# Patient Record
Sex: FEMALE | URBAN - METROPOLITAN AREA
[De-identification: names, ages, dates, MRNs, and addresses within clinical notes are randomized per-mention and may not be internally consistent; named-entity substitution may affect disease eponyms.]

---

## 2022-06-04 ENCOUNTER — TELEPHONE ENCOUNTER (OUTPATIENT)
Dept: URBAN - METROPOLITAN AREA CLINIC 68 | Facility: CLINIC | Age: 62
End: 2022-06-04

## 2022-06-05 ENCOUNTER — TELEPHONE ENCOUNTER (OUTPATIENT)
Dept: URBAN - METROPOLITAN AREA CLINIC 68 | Facility: CLINIC | Age: 62
End: 2022-06-05

## 2022-06-25 ENCOUNTER — TELEPHONE ENCOUNTER (OUTPATIENT)
Age: 62
End: 2022-06-25

## 2022-06-26 ENCOUNTER — TELEPHONE ENCOUNTER (OUTPATIENT)
Age: 62
End: 2022-06-26

## 2024-08-07 ENCOUNTER — PATIENT OUTREACH (OUTPATIENT)
Dept: HEMATOLOGY/ONCOLOGY | Facility: CLINIC | Age: 64
End: 2024-08-07
Payer: COMMERCIAL

## 2024-08-07 ENCOUNTER — TELEPHONE (OUTPATIENT)
Dept: HEMATOLOGY/ONCOLOGY | Facility: CLINIC | Age: 64
End: 2024-08-07

## 2024-08-07 SDOH — ECONOMIC STABILITY: INCOME INSECURITY: IN THE LAST 12 MONTHS, WAS THERE A TIME WHEN YOU WERE NOT ABLE TO PAY THE MORTGAGE OR RENT ON TIME?: NO

## 2024-08-07 SDOH — ECONOMIC STABILITY: TRANSPORTATION INSECURITY
IN THE PAST 12 MONTHS, HAS THE LACK OF TRANSPORTATION KEPT YOU FROM MEDICAL APPOINTMENTS OR FROM GETTING MEDICATIONS?: NO

## 2024-08-07 SDOH — ECONOMIC STABILITY: TRANSPORTATION INSECURITY
IN THE PAST 12 MONTHS, HAS LACK OF TRANSPORTATION KEPT YOU FROM MEETINGS, WORK, OR FROM GETTING THINGS NEEDED FOR DAILY LIVING?: NO

## 2024-08-07 ASSESSMENT — SOCIAL DETERMINANTS OF HEALTH (SDOH)
WITHIN THE LAST YEAR, HAVE YOU BEEN KICKED, HIT, SLAPPED, OR OTHERWISE PHYSICALLY HURT BY YOUR PARTNER OR EX-PARTNER?: NO
WITHIN THE LAST YEAR, HAVE YOU BEEN HUMILIATED OR EMOTIONALLY ABUSED IN OTHER WAYS BY YOUR PARTNER OR EX-PARTNER?: NO
HOW HARD IS IT FOR YOU TO PAY FOR THE VERY BASICS LIKE FOOD, HOUSING, MEDICAL CARE, AND HEATING?: NOT HARD AT ALL
IN THE PAST 12 MONTHS, HAS THE ELECTRIC, GAS, OIL, OR WATER COMPANY THREATENED TO SHUT OFF SERVICE IN YOUR HOME?: NO
WITHIN THE LAST YEAR, HAVE TO BEEN RAPED OR FORCED TO HAVE ANY KIND OF SEXUAL ACTIVITY BY YOUR PARTNER OR EX-PARTNER?: NO
WITHIN THE LAST YEAR, HAVE YOU BEEN AFRAID OF YOUR PARTNER OR EX-PARTNER?: NO

## 2024-08-07 ASSESSMENT — LIFESTYLE VARIABLES
HOW OFTEN DO YOU HAVE SIX OR MORE DRINKS ON ONE OCCASION: NEVER
HOW MANY STANDARD DRINKS CONTAINING ALCOHOL DO YOU HAVE ON A TYPICAL DAY: PATIENT DOES NOT DRINK

## 2024-08-08 ENCOUNTER — OFFICE VISIT (OUTPATIENT)
Dept: HEMATOLOGY/ONCOLOGY | Facility: CLINIC | Age: 64
End: 2024-08-08
Payer: COMMERCIAL

## 2024-08-08 VITALS
SYSTOLIC BLOOD PRESSURE: 91 MMHG | WEIGHT: 132.61 LBS | BODY MASS INDEX: 20.1 KG/M2 | TEMPERATURE: 98.1 F | HEART RATE: 83 BPM | OXYGEN SATURATION: 97 % | DIASTOLIC BLOOD PRESSURE: 61 MMHG | RESPIRATION RATE: 16 BRPM | HEIGHT: 68 IN

## 2024-08-08 DIAGNOSIS — C18.9 MALIGNANT NEOPLASM OF COLON, UNSPECIFIED PART OF COLON (MULTI): Primary | ICD-10-CM

## 2024-08-08 DIAGNOSIS — C18.9 COLON CANCER (MULTI): ICD-10-CM

## 2024-08-08 PROCEDURE — G2211 COMPLEX E/M VISIT ADD ON: HCPCS | Performed by: STUDENT IN AN ORGANIZED HEALTH CARE EDUCATION/TRAINING PROGRAM

## 2024-08-08 PROCEDURE — 99215 OFFICE O/P EST HI 40 MIN: CPT | Performed by: STUDENT IN AN ORGANIZED HEALTH CARE EDUCATION/TRAINING PROGRAM

## 2024-08-08 PROCEDURE — 3008F BODY MASS INDEX DOCD: CPT | Performed by: STUDENT IN AN ORGANIZED HEALTH CARE EDUCATION/TRAINING PROGRAM

## 2024-08-08 RX ORDER — HEPARIN SODIUM,PORCINE/PF 10 UNIT/ML
50 SYRINGE (ML) INTRAVENOUS AS NEEDED
OUTPATIENT
Start: 2024-08-08

## 2024-08-08 RX ORDER — HEPARIN 100 UNIT/ML
500 SYRINGE INTRAVENOUS AS NEEDED
OUTPATIENT
Start: 2024-08-08

## 2024-08-08 RX ORDER — VIT C/E/ZN/COPPR/LUTEIN/ZEAXAN 250MG-90MG
25 CAPSULE ORAL DAILY
COMMUNITY

## 2024-08-08 RX ORDER — WARFARIN 7.5 MG/1
7.5 TABLET ORAL
COMMUNITY

## 2024-08-08 RX ORDER — PROCHLORPERAZINE MALEATE 10 MG
10 TABLET ORAL EVERY 6 HOURS PRN
Qty: 30 TABLET | Refills: 5 | Status: SHIPPED | OUTPATIENT
Start: 2024-08-08

## 2024-08-08 ASSESSMENT — COLUMBIA-SUICIDE SEVERITY RATING SCALE - C-SSRS
1. IN THE PAST MONTH, HAVE YOU WISHED YOU WERE DEAD OR WISHED YOU COULD GO TO SLEEP AND NOT WAKE UP?: NO
6. HAVE YOU EVER DONE ANYTHING, STARTED TO DO ANYTHING, OR PREPARED TO DO ANYTHING TO END YOUR LIFE?: NO
2. HAVE YOU ACTUALLY HAD ANY THOUGHTS OF KILLING YOURSELF?: NO

## 2024-08-08 ASSESSMENT — PATIENT HEALTH QUESTIONNAIRE - PHQ9
SUM OF ALL RESPONSES TO PHQ9 QUESTIONS 1 AND 2: 0
1. LITTLE INTEREST OR PLEASURE IN DOING THINGS: NOT AT ALL
2. FEELING DOWN, DEPRESSED OR HOPELESS: NOT AT ALL

## 2024-08-08 ASSESSMENT — ENCOUNTER SYMPTOMS
OCCASIONAL FEELINGS OF UNSTEADINESS: 0
LOSS OF SENSATION IN FEET: 0
DEPRESSION: 0

## 2024-08-08 ASSESSMENT — PAIN SCALES - GENERAL: PAINLEVEL: 0-NO PAIN

## 2024-08-08 NOTE — PROGRESS NOTES
Patient ID: Harmony Victor is a 64 y.o. female.  Visit Type: Initial Visit    Cancer History:  DIAGNOSIS: R-sided colon cancer     STAGING: IV    CURRENT SITES OF DISEASE:   Liver, ileocecal valve, ?head of pancreas    MOLECULAR/GENOMIC:  MMR-deficient (loss of MLH1 and PMS2  KRAS-WT  NRAS-WT  BRAF-WT    HER2 IHC equiv, FISH negative    TUMOR MARKER:   2/2024:  19, CA 19-9 492, CEA 6.2, AFP 2.1    CURRENT TREATMENT:   3/6/24 -- present: pembrolizumab    PRIOR TREATMENT:   2/19/24: FOLFOX x1 cycle, d/c after MMR IHC showed deficiency    HISTORY:   January 2024: Swelling and pain in left upper extremity   1/28/2024: US arm duplex left venous showed acute DVT in the mid to distal brachial vein, and superficial venous thrombosis mid distal basilic vein. US LEs showed b/l occlusive thrombus in L proximal femoral vein through the visible left upper calf veins. CT PE  mild burden of bilateral pulmonary emboli beginning at the segmental level and extending peripherally. No CT evidence of right heart strain.  CT venogram abdomen: DVT LLE extending into superficial femoral vein. Partially visualized pulmonary emboli. Irregular bladder wall thickening. Enlarged intra-abdominal lymph nodes. Findings concerning for potential underlying malignant process.    1/29/2024: CT A/P: Multiple hypodense lesions throughout both lobes of the liver most consistent with hepatic metastatic disease. Inflammation within the adipose tissue the inferior pelvis surrounding the uterus and rectum extending to the presacral space. This is of uncertain etiology. No focal mass identified. Very small bilateral pleural effusions.    1/30/2024: R liver lesion biopsy. Path showed metastatic adenocarcinoma consistent with colorectal primary. Tumor cells positive for CK20, CDX2, SATB2. Negative for CK7, TTF 1, Napsin a, [place] 8, lara 3 and ER. Morphologic as well as immunohistochemical features consistent with metastatic colorectal carcinoma. 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 Defective mismatch repair. BRAF mutation not detected. KRAS mutation not detected. NRAS mutation not detected. HER2 negative.    2/1/2024: C-scope: arge ulcerated mass involving ileocecal valve. EGD showed normal mucosa in duodenum, stomach, esophagus. Pathology of ileocecal valve mass showed adenocarcinoma    2/19/24: FOLFOX x1. 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 FOLFOX discontinued  3/6/24: Started on pembrolizumab 400mg q6wk  7/22/24: PET CT showed no evidence of activity in the liver or colon, but with hypermetabolic focus in the posterior aspect of the pancreatic head.    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    Subjective:   Feeling very well, no side effects from pembrolizumab. Just moved here from [place].     No fevers, chills, chest pain, shortness of breath, abdominal pain, nausea, vomiting, diarrhea, or rashes.    ROS as above. Remainder of 10-point review of systems elicited and negative.    Objective:  BP 91/61 (BP Location: Right arm, Patient Position: Sitting, BP Cuff Size: Adult)   Pulse 83   Temp 36.7 °C (98.1 °F) (Temporal)   Resp 16   Ht (S) 1.716 m (5' 7.56\")   Wt 60.2 kg (132 lb 9.7 oz)   SpO2 97%   BMI 20.43 kg/m²     PE:  Gen: A&O, NAD  Head: Normocephalic, atraumatic  Eyes: no scleral icterus  ENT: mucous membranes moist, no oropharyngeal lesions  Resp: Lungs CTAB  Cardiac: Normal rate, regular rhythm, no murmurs appreciated  Abdomen: Soft, nondistended, nontender, +BS  Neuro: CNII-XII grossly intact  Psych: appropriate mood & affect  Skin: warm, dry, " no apparent rashes     ECOG Performance Status: 0    PET CT 7/24/24:  Hypermetabolic focus localizing to the posterior aspect of the pancreatic head within the right upper quadrant, of uncertain etiology.  Differential considerations would include a primary pancreatic neoplasm although no clear lesion is seen currently.  In adjacent hypermetabolic peripancreatic node is also a consideration and more likely.  Further evaluation with a CT of the abdomen and pelvis with contrast may provide more information and is recommended.     CT abd/pelvis 7/26/24:  IMPRESSION:   1. No CT findings of pancreatic mass or peripancreatic lymph node at the area of concern.   2. Small low-attenuation liver lesions probably of no clinical significance.   3. Simple cyst lower pole left kidney   4. Small bilateral adnexal varices     Assessment & Plan:   Harmony Victor is a 64 y.o. 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      I have extensively reviewed the records of her excellent care at outside cancer center. She recently moved to [facility] from [place] and presents today to establish care.     Briefly, she presented to the ED Jan 2024 with extensive VTE in upper and lower extremities and with b/l PEs. This prompted malignancy workup, which revealed multiple metastatic lesions in the liver. C-scope showed mass in the ileocecal valve. Pathology from ileocecal valve mass and a liver biopsy confirmed adenocarcinoma of colon primary.     She started FOLFOX and received 1 cycle, but then the MMR IHC testing resulted after the first cycle, which showed deficient expression of MLH1 and PMS2. She subsequently discontinued FOLFOX and started pembrolizumab 3/2024.     I personally reviewed the images of the PET CT performed on 7/24/24, which showed complete response in the liver and the ileocecal valve, but with a hypermetabolic  area in the head of the pancreas. I also personally reviewed the images of the subsequent CT abd/pelvis, which do not show a definite lesion in the pancreas to correspond to the uptake on PET CT.    It is interesting that at dx  was elevated at 490; repeat 4/2024 was miniimally improved at 460. I will draw another  with her next set of labs.     She is overall tolerating pembrolizumab very well with no signs of irAEs. I discussed that I recommend continuing pembro x2 years as long as it continues to be effective with no significant toxicity.     As for the uptake in the head of pancreas, I think it is too suspicious to ignore, especially with the elevated  that did not improve with treatment of liver metastases. I discussed with her and her  that I will present her imaging at Tumor Board next week, and suspect that one of three recommendations will be made: A) close observation, B) EUS with FNA, or C) MRCP. I will reach out to them after the tumor board discussion.     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

## 2024-08-08 NOTE — PROGRESS NOTES
Patient ambulated into clinic for new patient visit with Dr. Hayes accompanied by her  Kevan. Medications and allergies reviewed.    Patient recently moved to [place] from [place] on 7/29. She had been receiving pembrolizumab every 6 weeks at [facility]. Last dose was 7/10/24.     Plan: Continue on Pembrolizumab at [facility] which is closer to her home     Patient declined print out of pembrolizumab at this time.    Yellow fever card, red bag contents given and reviewed with Gwendolyn.   Phone numbers given for [facility] and [facility] main line.

## 2024-08-14 ENCOUNTER — APPOINTMENT (OUTPATIENT)
Dept: HEMATOLOGY/ONCOLOGY | Facility: HOSPITAL | Age: 64
End: 2024-08-14
Payer: COMMERCIAL

## 2024-08-19 ENCOUNTER — TELEPHONE (OUTPATIENT)
Dept: HEMATOLOGY/ONCOLOGY | Facility: HOSPITAL | Age: 64
End: 2024-08-19
Payer: COMMERCIAL

## 2024-08-19 ENCOUNTER — TELEPHONE (OUTPATIENT)
Dept: HEMATOLOGY/ONCOLOGY | Facility: CLINIC | Age: 64
End: 2024-08-19
Payer: COMMERCIAL

## 2024-08-19 NOTE — TELEPHONE ENCOUNTER
Responded to patient's my chart message regarding questions about first infusion.   Contacted her to discuss her swollen ankle as she is concerned it could be a blood clot.   Callback number and VM left.

## 2024-08-23 DIAGNOSIS — C18.9 MALIGNANT NEOPLASM OF COLON, UNSPECIFIED PART OF COLON (MULTI): Primary | ICD-10-CM

## 2024-08-23 RX ORDER — PROCHLORPERAZINE MALEATE 10 MG
10 TABLET ORAL EVERY 6 HOURS PRN
OUTPATIENT
Start: 2024-08-23

## 2024-08-23 RX ORDER — ALBUTEROL SULFATE 0.83 MG/ML
3 SOLUTION RESPIRATORY (INHALATION) AS NEEDED
OUTPATIENT
Start: 2024-08-23

## 2024-08-23 RX ORDER — DIPHENHYDRAMINE HYDROCHLORIDE 50 MG/ML
50 INJECTION INTRAMUSCULAR; INTRAVENOUS AS NEEDED
OUTPATIENT
Start: 2024-08-23

## 2024-08-23 RX ORDER — EPINEPHRINE 0.3 MG/.3ML
0.3 INJECTION SUBCUTANEOUS EVERY 5 MIN PRN
OUTPATIENT
Start: 2024-08-23

## 2024-08-23 RX ORDER — FAMOTIDINE 10 MG/ML
20 INJECTION INTRAVENOUS ONCE AS NEEDED
OUTPATIENT
Start: 2024-08-23

## 2024-08-23 RX ORDER — PROCHLORPERAZINE EDISYLATE 5 MG/ML
10 INJECTION INTRAMUSCULAR; INTRAVENOUS EVERY 6 HOURS PRN
OUTPATIENT
Start: 2024-08-23

## 2024-08-24 ENCOUNTER — LAB (OUTPATIENT)
Dept: LAB | Facility: LAB | Age: 64
End: 2024-08-24
Payer: COMMERCIAL

## 2024-08-24 DIAGNOSIS — C18.9 MALIGNANT NEOPLASM OF COLON, UNSPECIFIED PART OF COLON (MULTI): ICD-10-CM

## 2024-08-24 PROCEDURE — 87340 HEPATITIS B SURFACE AG IA: CPT

## 2024-08-24 PROCEDURE — 86704 HEP B CORE ANTIBODY TOTAL: CPT

## 2024-08-24 PROCEDURE — 36415 COLL VENOUS BLD VENIPUNCTURE: CPT

## 2024-08-24 PROCEDURE — 80053 COMPREHEN METABOLIC PANEL: CPT

## 2024-08-24 PROCEDURE — 86706 HEP B SURFACE ANTIBODY: CPT

## 2024-08-24 PROCEDURE — 86301 IMMUNOASSAY TUMOR CA 19-9: CPT

## 2024-08-24 PROCEDURE — 82533 TOTAL CORTISOL: CPT

## 2024-08-24 PROCEDURE — 84443 ASSAY THYROID STIM HORMONE: CPT

## 2024-08-24 PROCEDURE — 85025 COMPLETE CBC W/AUTO DIFF WBC: CPT

## 2024-08-24 PROCEDURE — 82024 ASSAY OF ACTH: CPT

## 2024-08-25 LAB
ALBUMIN SERPL BCP-MCNC: 4.7 G/DL (ref 3.4–5)
ALP SERPL-CCNC: 82 U/L (ref 33–136)
ALT SERPL W P-5'-P-CCNC: 19 U/L (ref 7–45)
ANION GAP SERPL CALC-SCNC: 15 MMOL/L (ref 10–20)
AST SERPL W P-5'-P-CCNC: 22 U/L (ref 9–39)
BASOPHILS # BLD AUTO: 0.06 X10*3/UL (ref 0–0.1)
BASOPHILS NFR BLD AUTO: 1.4 %
BILIRUB SERPL-MCNC: 0.5 MG/DL (ref 0–1.2)
BUN SERPL-MCNC: 16 MG/DL (ref 6–23)
CALCIUM SERPL-MCNC: 10 MG/DL (ref 8.6–10.6)
CANCER AG19-9 SERPL-ACNC: 21.18 U/ML
CHLORIDE SERPL-SCNC: 103 MMOL/L (ref 98–107)
CO2 SERPL-SCNC: 27 MMOL/L (ref 21–32)
CORTIS AM PEAK SERPL-MSCNC: 18 UG/DL (ref 5–20)
CREAT SERPL-MCNC: 0.97 MG/DL (ref 0.5–1.05)
EGFRCR SERPLBLD CKD-EPI 2021: 66 ML/MIN/1.73M*2
EOSINOPHIL # BLD AUTO: 0.17 X10*3/UL (ref 0–0.7)
EOSINOPHIL NFR BLD AUTO: 3.8 %
ERYTHROCYTE [DISTWIDTH] IN BLOOD BY AUTOMATED COUNT: 15.9 % (ref 11.5–14.5)
GLUCOSE SERPL-MCNC: 84 MG/DL (ref 74–99)
HBV CORE AB SER QL: NONREACTIVE
HBV SURFACE AB SER-ACNC: <3.1 MIU/ML
HBV SURFACE AG SERPL QL IA: NONREACTIVE
HCT VFR BLD AUTO: 39.5 % (ref 36–46)
HGB BLD-MCNC: 12.2 G/DL (ref 12–16)
IMM GRANULOCYTES # BLD AUTO: 0.02 X10*3/UL (ref 0–0.7)
IMM GRANULOCYTES NFR BLD AUTO: 0.5 % (ref 0–0.9)
LYMPHOCYTES # BLD AUTO: 1.44 X10*3/UL (ref 1.2–4.8)
LYMPHOCYTES NFR BLD AUTO: 32.6 %
MCH RBC QN AUTO: 27.3 PG (ref 26–34)
MCHC RBC AUTO-ENTMCNC: 30.9 G/DL (ref 32–36)
MCV RBC AUTO: 88 FL (ref 80–100)
MONOCYTES # BLD AUTO: 0.42 X10*3/UL (ref 0.1–1)
MONOCYTES NFR BLD AUTO: 9.5 %
NEUTROPHILS # BLD AUTO: 2.31 X10*3/UL (ref 1.2–7.7)
NEUTROPHILS NFR BLD AUTO: 52.2 %
NRBC BLD-RTO: 0 /100 WBCS (ref 0–0)
PLATELET # BLD AUTO: 266 X10*3/UL (ref 150–450)
POTASSIUM SERPL-SCNC: 4.2 MMOL/L (ref 3.5–5.3)
PROT SERPL-MCNC: 7.4 G/DL (ref 6.4–8.2)
RBC # BLD AUTO: 4.47 X10*6/UL (ref 4–5.2)
SODIUM SERPL-SCNC: 141 MMOL/L (ref 136–145)
TSH SERPL-ACNC: 1.68 MIU/L (ref 0.44–3.98)
WBC # BLD AUTO: 4.4 X10*3/UL (ref 4.4–11.3)

## 2024-08-26 ENCOUNTER — LAB (OUTPATIENT)
Dept: LAB | Facility: CLINIC | Age: 64
End: 2024-08-26
Payer: COMMERCIAL

## 2024-08-26 ENCOUNTER — TELEMEDICINE (OUTPATIENT)
Dept: HEMATOLOGY/ONCOLOGY | Facility: HOSPITAL | Age: 64
End: 2024-08-26
Payer: COMMERCIAL

## 2024-08-26 ENCOUNTER — SOCIAL WORK (OUTPATIENT)
Dept: HEMATOLOGY/ONCOLOGY | Facility: CLINIC | Age: 64
End: 2024-08-26

## 2024-08-26 ENCOUNTER — INFUSION (OUTPATIENT)
Dept: HEMATOLOGY/ONCOLOGY | Facility: CLINIC | Age: 64
End: 2024-08-26
Payer: COMMERCIAL

## 2024-08-26 VITALS
OXYGEN SATURATION: 97 % | RESPIRATION RATE: 16 BRPM | SYSTOLIC BLOOD PRESSURE: 97 MMHG | DIASTOLIC BLOOD PRESSURE: 61 MMHG | HEIGHT: 68 IN | BODY MASS INDEX: 20.43 KG/M2 | WEIGHT: 134.81 LBS | HEART RATE: 77 BPM | TEMPERATURE: 98.2 F

## 2024-08-26 DIAGNOSIS — C18.9 MALIGNANT NEOPLASM OF COLON, UNSPECIFIED PART OF COLON (MULTI): Primary | ICD-10-CM

## 2024-08-26 DIAGNOSIS — Z29.89 ENCOUNTER FOR IMMUNOTHERAPY: ICD-10-CM

## 2024-08-26 DIAGNOSIS — C18.9 MALIGNANT NEOPLASM OF COLON, UNSPECIFIED PART OF COLON (MULTI): ICD-10-CM

## 2024-08-26 LAB — ACTH PLAS-MCNC: 16 PG/ML (ref 7.2–63.3)

## 2024-08-26 PROCEDURE — 96413 CHEMO IV INFUSION 1 HR: CPT

## 2024-08-26 PROCEDURE — 2500000004 HC RX 250 GENERAL PHARMACY W/ HCPCS (ALT 636 FOR OP/ED): Performed by: STUDENT IN AN ORGANIZED HEALTH CARE EDUCATION/TRAINING PROGRAM

## 2024-08-26 PROCEDURE — 2500000004 HC RX 250 GENERAL PHARMACY W/ HCPCS (ALT 636 FOR OP/ED): Mod: JZ | Performed by: STUDENT IN AN ORGANIZED HEALTH CARE EDUCATION/TRAINING PROGRAM

## 2024-08-26 PROCEDURE — 99214 OFFICE O/P EST MOD 30 MIN: CPT | Performed by: STUDENT IN AN ORGANIZED HEALTH CARE EDUCATION/TRAINING PROGRAM

## 2024-08-26 PROCEDURE — 85610 PROTHROMBIN TIME: CPT

## 2024-08-26 PROCEDURE — 36415 COLL VENOUS BLD VENIPUNCTURE: CPT

## 2024-08-26 RX ORDER — EPINEPHRINE 0.3 MG/.3ML
0.3 INJECTION SUBCUTANEOUS EVERY 5 MIN PRN
Status: DISCONTINUED | OUTPATIENT
Start: 2024-08-26 | End: 2024-08-26 | Stop reason: HOSPADM

## 2024-08-26 RX ORDER — PROCHLORPERAZINE EDISYLATE 5 MG/ML
10 INJECTION INTRAMUSCULAR; INTRAVENOUS EVERY 6 HOURS PRN
Status: DISCONTINUED | OUTPATIENT
Start: 2024-08-26 | End: 2024-08-26 | Stop reason: HOSPADM

## 2024-08-26 RX ORDER — HEPARIN SODIUM,PORCINE/PF 10 UNIT/ML
50 SYRINGE (ML) INTRAVENOUS AS NEEDED
OUTPATIENT
Start: 2024-08-26

## 2024-08-26 RX ORDER — HEPARIN 100 UNIT/ML
500 SYRINGE INTRAVENOUS AS NEEDED
Status: DISCONTINUED | OUTPATIENT
Start: 2024-08-26 | End: 2024-08-26 | Stop reason: HOSPADM

## 2024-08-26 RX ORDER — HEPARIN 100 UNIT/ML
500 SYRINGE INTRAVENOUS AS NEEDED
OUTPATIENT
Start: 2024-08-26

## 2024-08-26 RX ORDER — DIPHENHYDRAMINE HYDROCHLORIDE 50 MG/ML
50 INJECTION INTRAMUSCULAR; INTRAVENOUS AS NEEDED
Status: DISCONTINUED | OUTPATIENT
Start: 2024-08-26 | End: 2024-08-26 | Stop reason: HOSPADM

## 2024-08-26 RX ORDER — FAMOTIDINE 10 MG/ML
20 INJECTION INTRAVENOUS ONCE AS NEEDED
Status: DISCONTINUED | OUTPATIENT
Start: 2024-08-26 | End: 2024-08-26 | Stop reason: HOSPADM

## 2024-08-26 RX ORDER — ALBUTEROL SULFATE 0.83 MG/ML
3 SOLUTION RESPIRATORY (INHALATION) AS NEEDED
Status: DISCONTINUED | OUTPATIENT
Start: 2024-08-26 | End: 2024-08-26 | Stop reason: HOSPADM

## 2024-08-26 RX ORDER — PROCHLORPERAZINE MALEATE 10 MG
10 TABLET ORAL EVERY 6 HOURS PRN
Status: DISCONTINUED | OUTPATIENT
Start: 2024-08-26 | End: 2024-08-26 | Stop reason: HOSPADM

## 2024-08-26 ASSESSMENT — PAIN SCALES - GENERAL: PAINLEVEL: 0-NO PAIN

## 2024-08-26 NOTE — PROGRESS NOTES
Patient is here today for infusion - new patient to center, oriented her to center.  Independent double check done prior to infusion today-   b/h/ lung sounds not auscultated  Patient tolerated infusion well.  No complaints.  Patient states she may get labs done peripherally prior to next treatment at lab closer to her home.  Call back instructions reviewed.    Patient verbalizes understanding of plan of care.  Ambulated off unit without difficulty, steady gait.

## 2024-08-26 NOTE — PROGRESS NOTES
This  met with the pt for the first time today. The pt reports she moved back to [place] from [place] and before that lived in [place]. The pt was by herself for today's appointment. The pt reports she moved back to [place] to be closer to her children and grandchildren. She has 2 grown sons.   The  gave the pt brochures/handouts for: the  name/contact information, [facility] brochure, financial counselor name/contact information with a financial application for assistance. The  also gave the pt resources for Colon Cancer resources from coloncancerfoundation.org     The pt was appreciative of assistance. The SW will continue to follow up.

## 2024-08-27 LAB
INR PPP: 3.2 (ref 0.9–1.1)
PROTHROMBIN TIME: 36.5 SECONDS (ref 9.8–12.8)

## 2024-08-28 DIAGNOSIS — I82.499 DEEP VEIN THROMBOSIS (DVT) OF OTHER VEIN OF LOWER EXTREMITY, UNSPECIFIED CHRONICITY, UNSPECIFIED LATERALITY (MULTI): ICD-10-CM

## 2024-08-30 ENCOUNTER — TELEPHONE (OUTPATIENT)
Dept: HEMATOLOGY/ONCOLOGY | Facility: HOSPITAL | Age: 64
End: 2024-08-30
Payer: COMMERCIAL

## 2024-08-30 NOTE — TELEPHONE ENCOUNTER
Called and spoke with Harmony per her Less[facility] Only message request. She stated that she had tried to schedule the referral to a hematologist, but was told that there was no availability until November. She had already been able to get her PCP appointment moved up to mid-September and confirmed that Dr. Gottlieb would be able to manage her Coumadin, so she declined the November appointment. Advised her that Debra had been hoping she could get in with a hematologist sooner than that, but that she should keep the September appointment if this was the soonest available. Confirmed that her lab orders are active and she can go to any  lab as a walk-in prior to her FUV with Debra on 10/7. She expressed understanding and agreement with plan.

## 2024-09-06 NOTE — PROGRESS NOTES
Patient ID: Harmony Victor is a 64 y.o. female.  Visit Type: Follow Up    Cancer History:  DIAGNOSIS: R-sided colon cancer     STAGING: IV    CURRENT SITES OF DISEASE:   Liver, ileocecal valve, ?head of pancreas    MOLECULAR/GENOMIC:  MMR-deficient (loss of MLH1 and PMS2  KRAS-WT  NRAS-WT  BRAF-WT    HER2 IHC equiv, FISH negative    TUMOR MARKER:   2/2024:  19, CA 19-9 492, CEA 6.2, AFP 2.1  8/24/24 CA 19-9: 21    CURRENT TREATMENT:   3/6/24 -- present: pembrolizumab    PRIOR TREATMENT:   2/19/24: FOLFOX x1 cycle, d/c after MMR IHC showed deficiency    HISTORY:   January 2024: Swelling and pain in left upper extremity   1/28/2024: US arm duplex left venous showed acute DVT in the mid to distal brachial vein, and superficial venous thrombosis mid distal basilic vein. US LEs showed b/l occlusive thrombus in L proximal femoral vein through the visible left upper calf veins. CT PE  mild burden of bilateral pulmonary emboli beginning at the segmental level and extending peripherally. No CT evidence of right heart strain.  CT venogram abdomen: DVT LLE extending into superficial femoral vein. Partially visualized pulmonary emboli. Irregular bladder wall thickening. Enlarged intra-abdominal lymph nodes. Findings concerning for potential underlying malignant process.    1/29/2024: CT A/P: Multiple hypodense lesions throughout both lobes of the liver most consistent with hepatic metastatic disease. Inflammation within the adipose tissue the inferior pelvis surrounding the uterus and rectum extending to the presacral space. This is of uncertain etiology. No focal mass identified. Very small bilateral pleural effusions.    1/30/2024: R liver lesion biopsy. Path showed metastatic adenocarcinoma consistent with colorectal primary. Tumor cells positive for CK20, CDX2, SATB2. Negative for CK7, TTF 1, Napsin a, [place] 8, lara 3 and ER. Morphologic as well as immunohistochemical features consistent with metastatic colorectal carcinoma.  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 Defective mismatch repair. BRAF mutation not detected. KRAS mutation not detected. NRAS mutation not detected. HER2 negative.    2/1/2024: C-scope: arge ulcerated mass involving ileocecal valve. EGD showed normal mucosa in duodenum, stomach, esophagus. Pathology of ileocecal valve mass showed adenocarcinoma    2/19/24: FOLFOX x1. 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 FOLFOX discontinued  3/6/24: Started on pembrolizumab 400mg q6wk  7/22/24: PET CT showed no evidence of activity in the liver or colon, but with hypermetabolic focus in the posterior aspect of the pancreatic head.    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    Subjective:   She just gets tired from the pembro infusions. Feels it gets to be a little more with each one. Has had 5 so far.  Wonders who will manage her coumadin levels. Her oncologist would manage it before.    No fevers, chills, chest pain, shortness of breath, abdominal pain, nausea, vomiting, diarrhea, or rashes.    ROS as above. Remainder of 10-point review of systems elicited and negative.    Objective:  There were no vitals taken for this visit.    PE:  Gen: A&O, NAD  Head: Normocephalic, atraumatic  Eyes: no scleral icterus  ENT: mucous membranes moist, no apparent lesions  Resp: No apparent respiratory distress  Neuro: CNII-XII grossly intact  Psych: appropriate mood & affect  Skin: no apparent rashes     ECOG Performance Status: 0    PET CT 7/24/24:  Hypermetabolic focus localizing to the posterior aspect of the pancreatic head within the  right upper quadrant, of uncertain etiology.  Differential considerations would include a primary pancreatic neoplasm although no clear lesion is seen currently.  In adjacent hypermetabolic peripancreatic node is also a consideration and more likely.  Further evaluation with a CT of the abdomen and pelvis with contrast may provide more information and is recommended.     CT abd/pelvis 7/26/24:  IMPRESSION:   1. No CT findings of pancreatic mass or peripancreatic lymph node at the area of concern.   2. Small low-attenuation liver lesions probably of no clinical significance.   3. Simple cyst lower pole left kidney   4. Small bilateral adnexal varices     Assessment & Plan:   Harmony Victor is a 64 y.o. 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      I have extensively reviewed the records of her excellent care at outside cancer center. [facility] [place] and presents today to establish care.     Briefly, she presented to the ED Jan 2024 with extensive VTE in upper and lower extremities and with b/l PEs. This prompted malignancy workup, which revealed multiple metastatic lesions in the liver. C-scope showed mass in the ileocecal valve. Pathology from ileocecal valve mass and a liver biopsy confirmed adenocarcinoma of colon primary.     She started FOLFOX and received 1 cycle, but then the MMR IHC testing resulted after the first cycle, which showed deficient expression of MLH1 and PMS2. She subsequently discontinued FOLFOX and started pembrolizumab 3/2024.     I personally reviewed the images of the PET CT performed on 7/24/24, which showed complete response in the liver and the ileocecal valve, but with a hypermetabolic area in the head of the pancreas. I also personally reviewed the images of the subsequent CT abd/pelvis, which do not show a definite lesion in the pancreas to  correspond to the uptake on PET CT.    It is interesting that at dx  was elevated at 490; repeat 4/2024 was miniimally improved at 460. I will draw another  with her next set of labs.     She is overall tolerating pembrolizumab very well with no signs of irAEs. I discussed that I recommend continuing pembro x2 years as long as it continues to be effective with no significant toxicity.     As for the uptake in the head of pancreas, I think it is too suspicious to ignore, especially with the elevated  that did not improve with treatment of liver metastases. I discussed with her and her  that I will present her imaging at Tumor Board next week, and suspect that one of three recommendations will be made: A) close observation, B) EUS with FNA, or C) MRCP. I will reach out to them after the tumor board discussion.     8/26/24: Will proceed with Pembrolizumab today. Will establish care with either PCP or hematology for her coumadin levels, whichever one she can get into first. CT scan every 12 weeks or after every 2 cycles.     Plan:   Metastatic colon cancer of the ileocecal valve, MMR-deficient  - Continue with pembrolizumab 400mg q6wk, last dose in [place] was 7/10/24, next dose due 8/21/24  - Pt prefers [facility] due to distance from her house  - she will get labs done prior to each cycle of Pembrolizumab. She prefers to get it done peripherally.   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

## 2024-09-19 ENCOUNTER — TELEPHONE (OUTPATIENT)
Dept: HEMATOLOGY/ONCOLOGY | Facility: HOSPITAL | Age: 64
End: 2024-09-19
Payer: COMMERCIAL

## 2024-09-19 NOTE — TELEPHONE ENCOUNTER
Called and spoke with Dr. Gottlieb to advise that Harmony had previously been switched to Coumadin d/t developing a blood clot while on Eliquis, and that a referral to hematology was placed for further management. Dr. Gottlieb was under the impression that Dr. Hayes was a hematologist; advised that she is a GI oncologist and had referred Harmony to hematology specifically so they could follow her for this issue.     Dr. Gottlieb states that she can continue to manage her Coumadin if needed, but she would just like to know if her hematologist approves the switch to Eliquis. Advised that I do not think she currently has a hematologist, as she couldn't get the referral scheduled for several months and was able to get in to see Dr. Gottlieb much sooner. Dr. Gottlieb states that she will be seeing Harmony on Monday and will discuss with her then. No further needs at this time.

## 2024-10-03 ENCOUNTER — LAB (OUTPATIENT)
Dept: LAB | Facility: LAB | Age: 64
End: 2024-10-03
Payer: COMMERCIAL

## 2024-10-03 DIAGNOSIS — C18.9 MALIGNANT NEOPLASM OF COLON, UNSPECIFIED PART OF COLON (MULTI): ICD-10-CM

## 2024-10-03 PROCEDURE — 82533 TOTAL CORTISOL: CPT

## 2024-10-03 PROCEDURE — 84443 ASSAY THYROID STIM HORMONE: CPT

## 2024-10-03 PROCEDURE — 80053 COMPREHEN METABOLIC PANEL: CPT

## 2024-10-03 PROCEDURE — 82024 ASSAY OF ACTH: CPT

## 2024-10-03 PROCEDURE — 82378 CARCINOEMBRYONIC ANTIGEN: CPT

## 2024-10-03 PROCEDURE — 85025 COMPLETE CBC W/AUTO DIFF WBC: CPT

## 2024-10-03 PROCEDURE — 86301 IMMUNOASSAY TUMOR CA 19-9: CPT

## 2024-10-04 LAB
ALBUMIN SERPL BCP-MCNC: 4.4 G/DL (ref 3.4–5)
ALP SERPL-CCNC: 87 U/L (ref 33–136)
ALT SERPL W P-5'-P-CCNC: 17 U/L (ref 7–45)
ANION GAP SERPL CALC-SCNC: 15 MMOL/L (ref 10–20)
AST SERPL W P-5'-P-CCNC: 25 U/L (ref 9–39)
BASOPHILS # BLD AUTO: 0.06 X10*3/UL (ref 0–0.1)
BASOPHILS NFR BLD AUTO: 0.9 %
BILIRUB SERPL-MCNC: 0.4 MG/DL (ref 0–1.2)
BUN SERPL-MCNC: 16 MG/DL (ref 6–23)
CALCIUM SERPL-MCNC: 9.8 MG/DL (ref 8.6–10.6)
CHLORIDE SERPL-SCNC: 103 MMOL/L (ref 98–107)
CO2 SERPL-SCNC: 27 MMOL/L (ref 21–32)
CORTIS AM PEAK SERPL-MSCNC: 16.7 UG/DL (ref 5–20)
CREAT SERPL-MCNC: 0.86 MG/DL (ref 0.5–1.05)
EGFRCR SERPLBLD CKD-EPI 2021: 76 ML/MIN/1.73M*2
EOSINOPHIL # BLD AUTO: 0.14 X10*3/UL (ref 0–0.7)
EOSINOPHIL NFR BLD AUTO: 2.2 %
ERYTHROCYTE [DISTWIDTH] IN BLOOD BY AUTOMATED COUNT: 14.5 % (ref 11.5–14.5)
GLUCOSE SERPL-MCNC: 89 MG/DL (ref 74–99)
HCT VFR BLD AUTO: 38.1 % (ref 36–46)
HGB BLD-MCNC: 12.3 G/DL (ref 12–16)
IMM GRANULOCYTES # BLD AUTO: 0.02 X10*3/UL (ref 0–0.7)
IMM GRANULOCYTES NFR BLD AUTO: 0.3 % (ref 0–0.9)
LYMPHOCYTES # BLD AUTO: 1.53 X10*3/UL (ref 1.2–4.8)
LYMPHOCYTES NFR BLD AUTO: 23.7 %
MCH RBC QN AUTO: 27.5 PG (ref 26–34)
MCHC RBC AUTO-ENTMCNC: 32.3 G/DL (ref 32–36)
MCV RBC AUTO: 85 FL (ref 80–100)
MONOCYTES # BLD AUTO: 0.41 X10*3/UL (ref 0.1–1)
MONOCYTES NFR BLD AUTO: 6.4 %
NEUTROPHILS # BLD AUTO: 4.29 X10*3/UL (ref 1.2–7.7)
NEUTROPHILS NFR BLD AUTO: 66.5 %
NRBC BLD-RTO: 0 /100 WBCS (ref 0–0)
PLATELET # BLD AUTO: 307 X10*3/UL (ref 150–450)
POTASSIUM SERPL-SCNC: 4.1 MMOL/L (ref 3.5–5.3)
PROT SERPL-MCNC: 7.4 G/DL (ref 6.4–8.2)
RBC # BLD AUTO: 4.47 X10*6/UL (ref 4–5.2)
SODIUM SERPL-SCNC: 141 MMOL/L (ref 136–145)
TSH SERPL-ACNC: 2.03 MIU/L (ref 0.44–3.98)
WBC # BLD AUTO: 6.5 X10*3/UL (ref 4.4–11.3)

## 2024-10-05 LAB — ACTH PLAS-MCNC: 20.1 PG/ML (ref 7.2–63.3)

## 2024-10-06 DIAGNOSIS — C18.9 MALIGNANT NEOPLASM OF COLON, UNSPECIFIED PART OF COLON (MULTI): Primary | ICD-10-CM

## 2024-10-06 LAB — CANCER AG19-9 SERPL-ACNC: 19.17 U/ML

## 2024-10-07 ENCOUNTER — DOCUMENTATION (OUTPATIENT)
Dept: HEMATOLOGY/ONCOLOGY | Facility: CLINIC | Age: 64
End: 2024-10-07

## 2024-10-07 ENCOUNTER — INFUSION (OUTPATIENT)
Dept: HEMATOLOGY/ONCOLOGY | Facility: CLINIC | Age: 64
End: 2024-10-07
Payer: COMMERCIAL

## 2024-10-07 ENCOUNTER — TELEMEDICINE (OUTPATIENT)
Dept: HEMATOLOGY/ONCOLOGY | Facility: HOSPITAL | Age: 64
End: 2024-10-07
Payer: COMMERCIAL

## 2024-10-07 VITALS
OXYGEN SATURATION: 97 % | WEIGHT: 134.04 LBS | TEMPERATURE: 97.7 F | DIASTOLIC BLOOD PRESSURE: 51 MMHG | HEART RATE: 58 BPM | SYSTOLIC BLOOD PRESSURE: 95 MMHG | RESPIRATION RATE: 16 BRPM | BODY MASS INDEX: 20.31 KG/M2 | HEIGHT: 68 IN

## 2024-10-07 DIAGNOSIS — C18.9 MALIGNANT NEOPLASM OF COLON, UNSPECIFIED PART OF COLON (MULTI): ICD-10-CM

## 2024-10-07 DIAGNOSIS — Z29.89 ENCOUNTER FOR IMMUNOTHERAPY: Primary | ICD-10-CM

## 2024-10-07 LAB — CEA SERPL-MCNC: 1.3 UG/L

## 2024-10-07 PROCEDURE — 2500000004 HC RX 250 GENERAL PHARMACY W/ HCPCS (ALT 636 FOR OP/ED): Performed by: STUDENT IN AN ORGANIZED HEALTH CARE EDUCATION/TRAINING PROGRAM

## 2024-10-07 PROCEDURE — 99214 OFFICE O/P EST MOD 30 MIN: CPT | Performed by: STUDENT IN AN ORGANIZED HEALTH CARE EDUCATION/TRAINING PROGRAM

## 2024-10-07 PROCEDURE — 2500000004 HC RX 250 GENERAL PHARMACY W/ HCPCS (ALT 636 FOR OP/ED): Mod: JZ | Performed by: STUDENT IN AN ORGANIZED HEALTH CARE EDUCATION/TRAINING PROGRAM

## 2024-10-07 PROCEDURE — 96413 CHEMO IV INFUSION 1 HR: CPT

## 2024-10-07 RX ORDER — HEPARIN 100 UNIT/ML
500 SYRINGE INTRAVENOUS AS NEEDED
OUTPATIENT
Start: 2024-10-07

## 2024-10-07 RX ORDER — FAMOTIDINE 10 MG/ML
20 INJECTION INTRAVENOUS ONCE AS NEEDED
Status: CANCELLED | OUTPATIENT
Start: 2024-10-07

## 2024-10-07 RX ORDER — HEPARIN 100 UNIT/ML
500 SYRINGE INTRAVENOUS AS NEEDED
Status: DISCONTINUED | OUTPATIENT
Start: 2024-10-07 | End: 2024-10-07 | Stop reason: HOSPADM

## 2024-10-07 RX ORDER — DIPHENHYDRAMINE HYDROCHLORIDE 50 MG/ML
50 INJECTION INTRAMUSCULAR; INTRAVENOUS AS NEEDED
Status: CANCELLED | OUTPATIENT
Start: 2024-10-07

## 2024-10-07 RX ORDER — EPINEPHRINE 0.3 MG/.3ML
0.3 INJECTION SUBCUTANEOUS EVERY 5 MIN PRN
Status: CANCELLED | OUTPATIENT
Start: 2024-10-07

## 2024-10-07 RX ORDER — DIPHENHYDRAMINE HYDROCHLORIDE 50 MG/ML
50 INJECTION INTRAMUSCULAR; INTRAVENOUS AS NEEDED
Status: DISCONTINUED | OUTPATIENT
Start: 2024-10-07 | End: 2024-10-07 | Stop reason: HOSPADM

## 2024-10-07 RX ORDER — FAMOTIDINE 10 MG/ML
20 INJECTION INTRAVENOUS ONCE AS NEEDED
Status: DISCONTINUED | OUTPATIENT
Start: 2024-10-07 | End: 2024-10-07 | Stop reason: HOSPADM

## 2024-10-07 RX ORDER — PROCHLORPERAZINE EDISYLATE 5 MG/ML
10 INJECTION INTRAMUSCULAR; INTRAVENOUS EVERY 6 HOURS PRN
Status: DISCONTINUED | OUTPATIENT
Start: 2024-10-07 | End: 2024-10-07 | Stop reason: HOSPADM

## 2024-10-07 RX ORDER — PROCHLORPERAZINE MALEATE 10 MG
10 TABLET ORAL EVERY 6 HOURS PRN
Status: DISCONTINUED | OUTPATIENT
Start: 2024-10-07 | End: 2024-10-07 | Stop reason: HOSPADM

## 2024-10-07 RX ORDER — ALBUTEROL SULFATE 0.83 MG/ML
3 SOLUTION RESPIRATORY (INHALATION) AS NEEDED
Status: DISCONTINUED | OUTPATIENT
Start: 2024-10-07 | End: 2024-10-07 | Stop reason: HOSPADM

## 2024-10-07 RX ORDER — PROCHLORPERAZINE MALEATE 10 MG
10 TABLET ORAL EVERY 6 HOURS PRN
Status: CANCELLED | OUTPATIENT
Start: 2024-10-07

## 2024-10-07 RX ORDER — ALBUTEROL SULFATE 0.83 MG/ML
3 SOLUTION RESPIRATORY (INHALATION) AS NEEDED
Status: CANCELLED | OUTPATIENT
Start: 2024-10-07

## 2024-10-07 RX ORDER — HEPARIN SODIUM,PORCINE/PF 10 UNIT/ML
50 SYRINGE (ML) INTRAVENOUS AS NEEDED
Status: DISCONTINUED | OUTPATIENT
Start: 2024-10-07 | End: 2024-10-07 | Stop reason: HOSPADM

## 2024-10-07 RX ORDER — EPINEPHRINE 0.3 MG/.3ML
0.3 INJECTION SUBCUTANEOUS EVERY 5 MIN PRN
Status: DISCONTINUED | OUTPATIENT
Start: 2024-10-07 | End: 2024-10-07 | Stop reason: HOSPADM

## 2024-10-07 RX ORDER — HEPARIN SODIUM,PORCINE/PF 10 UNIT/ML
50 SYRINGE (ML) INTRAVENOUS AS NEEDED
OUTPATIENT
Start: 2024-10-07

## 2024-10-07 RX ORDER — PROCHLORPERAZINE EDISYLATE 5 MG/ML
10 INJECTION INTRAMUSCULAR; INTRAVENOUS EVERY 6 HOURS PRN
Status: CANCELLED | OUTPATIENT
Start: 2024-10-07

## 2024-10-07 ASSESSMENT — PAIN SCALES - GENERAL: PAINLEVEL: 0-NO PAIN

## 2024-10-07 NOTE — PROGRESS NOTES
Integrative Oncology Massage and Support Intro Visit [id number]  Identified patient via name and date of birth.   Introduced Integrative Oncology Services including palliative massage/reiki/relaxation and stress management tools, self-care tools, and creating community and storytelling to patient.   Provider supported patient's decision and autonomy in choosing what services she will receive in relation to her oncology journey.   Patient has provider's business card with phone number and email address to contact provider regarding scheduling an appointment  Patient is interested in massage   Infusion treatments scheduled every 6 week  She agreed to call to schedule and appointment in the afternon on Monday if she would like to receive before her next infusion session

## 2024-10-07 NOTE — PROGRESS NOTES
Patient ID: Harmony Victor is a 64 y.o. female.  Visit Type: Follow Up    Cancer History:  DIAGNOSIS: R-sided colon cancer     STAGING: IV    CURRENT SITES OF DISEASE:   Liver, ileocecal valve, ?head of pancreas    MOLECULAR/GENOMIC:  MMR-deficient (loss of MLH1 and PMS2  KRAS-WT  NRAS-WT  BRAF-WT    HER2 IHC equiv, FISH negative    TUMOR MARKER:   2/2024:  19, CA 19-9 492, CEA 6.2, AFP 2.1  8/24/24 CA 19-9: 21  10/3/24 CA 19-9: 19    10/3/24 CEA: 1.3      CURRENT TREATMENT:   3/6/24 -- present: pembrolizumab    PRIOR TREATMENT:   2/19/24: FOLFOX x1 cycle, d/c after MMR IHC showed deficiency    HISTORY:   January 2024: Swelling and pain in left upper extremity   1/28/2024: US arm duplex left venous showed acute DVT in the mid to distal brachial vein, and superficial venous thrombosis mid distal basilic vein. US LEs showed b/l occlusive thrombus in L proximal femoral vein through the visible left upper calf veins. CT PE  mild burden of bilateral pulmonary emboli beginning at the segmental level and extending peripherally. No CT evidence of right heart strain.  CT venogram abdomen: DVT LLE extending into superficial femoral vein. Partially visualized pulmonary emboli. Irregular bladder wall thickening. Enlarged intra-abdominal lymph nodes. Findings concerning for potential underlying malignant process.    1/29/2024: CT A/P: Multiple hypodense lesions throughout both lobes of the liver most consistent with hepatic metastatic disease. Inflammation within the adipose tissue the inferior pelvis surrounding the uterus and rectum extending to the presacral space. This is of uncertain etiology. No focal mass identified. Very small bilateral pleural effusions.    1/30/2024: R liver lesion biopsy. Path showed metastatic adenocarcinoma consistent with colorectal primary. Tumor cells positive for CK20, CDX2, SATB2. Negative for CK7, TTF 1, Napsin a, [place] 8, lara 3 and ER. Morphologic as well as immunohistochemical features  consistent with metastatic colorectal carcinoma. 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 Defective mismatch repair. BRAF mutation not detected. KRAS mutation not detected. NRAS mutation not detected. HER2 negative.    2/1/2024: C-scope: arge ulcerated mass involving ileocecal valve. EGD showed normal mucosa in duodenum, stomach, esophagus. Pathology of ileocecal valve mass showed adenocarcinoma    2/19/24: FOLFOX x1. 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 FOLFOX discontinued  3/6/24: Started on pembrolizumab 400mg q6wk  7/22/24: PET CT showed no evidence of activity in the liver or colon, but with hypermetabolic focus in the posterior aspect of the pancreatic head.    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    Subjective:   Overall, feeling well. Gets fatigued after the pembro infusions. Has no other side effects.  PCP is managing her coumadin now  Going to go see [facility] and see what services they offer    No fevers, chills, chest pain, shortness of breath, abdominal pain, nausea, vomiting, diarrhea, or rashes.    ROS as above. Remainder of 10-point review of systems elicited and negative.    Objective:  There were no vitals taken for this visit.    PE:  Deferred due to phone visit    ECOG Performance Status: 0    PET CT 7/24/24:  Hypermetabolic focus localizing to the posterior aspect of the pancreatic head within the right upper quadrant, of uncertain etiology.  Differential considerations would include a primary pancreatic neoplasm although no clear lesion is seen currently.  In adjacent hypermetabolic  peripancreatic node is also a consideration and more likely.  Further evaluation with a CT of the abdomen and pelvis with contrast may provide more information and is recommended.     CT abd/pelvis 7/26/24:  IMPRESSION:   1. No CT findings of pancreatic mass or peripancreatic lymph node at the area of concern.   2. Small low-attenuation liver lesions probably of no clinical significance.   3. Simple cyst lower pole left kidney   4. Small bilateral adnexal varices     Assessment & Plan:   Harmony Victor is a 64 y.o. 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      I have extensively reviewed the records of her excellent care at outside cancer center. She recently moved to the [place] area from [place] and presents today to establish care.     Briefly, she presented to the ED Jan 2024 with extensive VTE in upper and lower extremities and with b/l PEs. This prompted malignancy workup, which revealed multiple metastatic lesions in the liver. C-scope showed mass in the ileocecal valve. Pathology from ileocecal valve mass and a liver biopsy confirmed adenocarcinoma of colon primary.     She started FOLFOX and received 1 cycle, but then the MMR IHC testing resulted after the first cycle, which showed deficient expression of MLH1 and PMS2. She subsequently discontinued FOLFOX and started pembrolizumab 3/2024.     I personally reviewed the images of the PET CT performed on 7/24/24, which showed complete response in the liver and the ileocecal valve, but with a hypermetabolic area in the head of the pancreas. I also personally reviewed the images of the subsequent CT abd/pelvis, which do not show a definite lesion in the pancreas to correspond to the uptake on PET CT.    It is interesting that at dx  was elevated at 490; repeat 4/2024 was miniimally improved at 460. I will draw another  with her next set of  labs.     She is overall tolerating pembrolizumab very well with no signs of irAEs. I discussed that I recommend continuing pembro x2 years as long as it continues to be effective with no significant toxicity.     As for the uptake in the head of pancreas, I think it is too suspicious to ignore, especially with the elevated  that did not improve with treatment of liver metastases. I discussed with her and her  that I will present her imaging at Tumor Board next week, and suspect that one of three recommendations will be made: A) close observation, B) EUS with FNA, or C) MRCP. I will reach out to them after the tumor board discussion.     8/26/24: Will proceed with Pembrolizumab today. Will establish care with either PCP or hematology for her coumadin levels, whichever one she can get into first. CT scan every 12 weeks or after every 2 cycles.     10/7/24: Tolerating Pembrolizumab well. Having imaging prior to next follow up in 6 weeks.     Plan:   Metastatic colon cancer of the ileocecal valve, MMR-deficient  - Continue with pembrolizumab 400mg q6wk, last dose in [place] was 7/10/24, next dose due in 6 weeks  - Pt prefers [place] due to distance from her house  - she will get labs done prior to each cycle of Pembrolizumab. She prefers to get it done peripherally.   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

## 2024-11-04 ENCOUNTER — HOSPITAL ENCOUNTER (OUTPATIENT)
Dept: RADIOLOGY | Facility: CLINIC | Age: 64
Discharge: HOME | End: 2024-11-04
Payer: COMMERCIAL

## 2024-11-04 DIAGNOSIS — C18.9 MALIGNANT NEOPLASM OF COLON, UNSPECIFIED PART OF COLON (MULTI): ICD-10-CM

## 2024-11-04 PROCEDURE — 2550000001 HC RX 255 CONTRASTS: Performed by: STUDENT IN AN ORGANIZED HEALTH CARE EDUCATION/TRAINING PROGRAM

## 2024-11-04 PROCEDURE — 74177 CT ABD & PELVIS W/CONTRAST: CPT | Performed by: RADIOLOGY

## 2024-11-04 PROCEDURE — 74177 CT ABD & PELVIS W/CONTRAST: CPT

## 2024-11-04 PROCEDURE — 71260 CT THORAX DX C+: CPT | Performed by: RADIOLOGY

## 2024-11-07 ENCOUNTER — TELEMEDICINE (OUTPATIENT)
Dept: HEMATOLOGY/ONCOLOGY | Facility: CLINIC | Age: 64
End: 2024-11-07
Payer: COMMERCIAL

## 2024-11-07 DIAGNOSIS — C18.9 MALIGNANT NEOPLASM OF COLON, UNSPECIFIED PART OF COLON (MULTI): Primary | ICD-10-CM

## 2024-11-07 PROCEDURE — 99215 OFFICE O/P EST HI 40 MIN: CPT | Performed by: STUDENT IN AN ORGANIZED HEALTH CARE EDUCATION/TRAINING PROGRAM

## 2024-11-07 PROCEDURE — G2211 COMPLEX E/M VISIT ADD ON: HCPCS | Performed by: STUDENT IN AN ORGANIZED HEALTH CARE EDUCATION/TRAINING PROGRAM

## 2024-11-07 RX ORDER — EPINEPHRINE 0.3 MG/.3ML
0.3 INJECTION SUBCUTANEOUS EVERY 5 MIN PRN
OUTPATIENT
Start: 2024-11-13

## 2024-11-07 RX ORDER — DIPHENHYDRAMINE HYDROCHLORIDE 50 MG/ML
50 INJECTION INTRAMUSCULAR; INTRAVENOUS AS NEEDED
OUTPATIENT
Start: 2024-11-13

## 2024-11-07 RX ORDER — PROCHLORPERAZINE EDISYLATE 5 MG/ML
10 INJECTION INTRAMUSCULAR; INTRAVENOUS EVERY 6 HOURS PRN
OUTPATIENT
Start: 2024-11-13

## 2024-11-07 RX ORDER — ALBUTEROL SULFATE 0.83 MG/ML
3 SOLUTION RESPIRATORY (INHALATION) AS NEEDED
OUTPATIENT
Start: 2024-11-13

## 2024-11-07 RX ORDER — FAMOTIDINE 10 MG/ML
20 INJECTION INTRAVENOUS ONCE AS NEEDED
OUTPATIENT
Start: 2024-11-13

## 2024-11-07 RX ORDER — PROCHLORPERAZINE MALEATE 10 MG
10 TABLET ORAL EVERY 6 HOURS PRN
OUTPATIENT
Start: 2024-11-13

## 2024-11-07 NOTE — PROGRESS NOTES
Cancer History:  DIAGNOSIS: R-sided colon cancer     STAGING: IV    CURRENT SITES OF DISEASE:   Liver, ileocecal valve, ?head of pancreas    MOLECULAR/GENOMIC:  MMR-deficient (loss of MLH1 and PMS2  KRAS-WT  NRAS-WT  BRAF-WT    HER2 IHC equiv, FISH negative    TUMOR MARKER:   2/2024:  19, CA 19-9 492, CEA 6.2, AFP 2.1  8/24/24 CA 19-9: 21  10/3/24 CA 19-9: 19    10/3/24 CEA: 1.3      CURRENT TREATMENT:   3/6/24 -- present: pembrolizumab    PRIOR TREATMENT:   2/19/24: FOLFOX x1 cycle, d/c after MMR IHC showed deficiency    HISTORY:   January 2024: Swelling and pain in left upper extremity   1/28/2024: US arm duplex left venous showed acute DVT in the mid to distal brachial vein, and superficial venous thrombosis mid distal basilic vein. US LEs showed b/l occlusive thrombus in L proximal femoral vein through the visible left upper calf veins. CT PE  mild burden of bilateral pulmonary emboli beginning at the segmental level and extending peripherally. No CT evidence of right heart strain.  CT venogram abdomen: DVT LLE extending into superficial femoral vein. Partially visualized pulmonary emboli. Irregular bladder wall thickening. Enlarged intra-abdominal lymph nodes. Findings concerning for potential underlying malignant process.    1/29/2024: CT A/P: Multiple hypodense lesions throughout both lobes of the liver most consistent with hepatic metastatic disease. Inflammation within the adipose tissue the inferior pelvis surrounding the uterus and rectum extending to the presacral space. This is of uncertain etiology. No focal mass identified. Very small bilateral pleural effusions.    1/30/2024: R liver lesion biopsy. Path showed metastatic adenocarcinoma consistent with colorectal primary. Tumor cells positive for CK20, CDX2, SATB2. Negative for CK7, TTF 1, Napsin a, [place] 8, lara 3 and ER. Morphologic as well as immunohistochemical features consistent with metastatic colorectal carcinoma. 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 Defective mismatch repair. BRAF mutation not detected. KRAS mutation not detected. NRAS mutation not detected. HER2 negative.    2/1/2024: C-scope: arge ulcerated mass involving ileocecal valve. EGD showed normal mucosa in duodenum, stomach, esophagus. Pathology of ileocecal valve mass showed adenocarcinoma    2/19/24: FOLFOX x1. 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 FOLFOX discontinued  3/6/24: Started on pembrolizumab 400mg q6wk  7/22/24: PET CT showed no evidence of activity in the liver or colon, but with hypermetabolic focus in the posterior aspect of the pancreatic head.    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    Subjective:   Overall, feeling well. Gets fatigued after the pembro infusions. Has no other side effects.  PCP is managing her coumadin now.      No fevers, chills, chest pain, shortness of breath, abdominal pain, nausea, vomiting, diarrhea, or rashes.    ROS as above. Remainder of 10-point review of systems elicited and negative.    Objective:  There were no vitals taken for this visit.    PE:  Gen: A&O, NAD  Head: Normocephalic, atraumatic  Eyes: no scleral icterus  ENT: mucous membranes moist, no oropharyngeal lesions  Resp: Lungs CTAB  Cardiac: Normal rate, regular rhythm, no murmurs appreciated  Abdomen: Soft, nondistended, nontender, +BS  Neuro: CNII-XII grossly intact  Psych: appropriate mood & affect  Skin: warm, dry, no apparent rashes     ECOG Performance Status: 0    PET CT 7/24/24:  Hypermetabolic focus localizing to the posterior aspect of the pancreatic head within  the right upper quadrant, of uncertain etiology.  Differential considerations would include a primary pancreatic neoplasm although no clear lesion is seen currently.  In adjacent hypermetabolic peripancreatic node is also a consideration and more likely.  Further evaluation with a CT of the abdomen and pelvis with contrast may provide more information and is recommended.     CT abd/pelvis 7/26/24:  IMPRESSION:   1. No CT findings of pancreatic mass or peripancreatic lymph node at the area of concern.   2. Small low-attenuation liver lesions probably of no clinical significance.   3. Simple cyst lower pole left kidney   4. Small bilateral adnexal varices     CT C/A/P 11/4/24:  Colon cancer. Please note that no comparison imaging is available in  our system. If imaging from outside facility is obtained, please send  an epic secure chat to Cristina Dutta, and an addendum can be made to  assess for treatment response.  1. Three indeterminate liver lesions correlation with prior imaging  is recommended. Alternatively these can be further investigated with  MRI liver or PET-CT. No definite evidence for metastasis elsewhere.  2. A few areas of focal subpleural ground-glass opacities are seen in  the posterior right lower lobe, likely atelectasis, this area bears  watching on future examinations.  3. Proximal jejunal wall thickening as described above could be  related to true pathology versus hyper peristalsis and bears watching  on follow-up scan.    Assessment & Plan:   Harmony Victor is a 64 y.o. 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      I have extensively reviewed the records of her excellent care at outside cancer center. She recently moved to [facility] from [place] and presents today to establish care.     Briefly, she presented to the ED Jan 2024 with extensive VTE in upper and lower extremities  and with b/l PEs. This prompted malignancy workup, which revealed multiple metastatic lesions in the liver. C-scope showed mass in the ileocecal valve. Pathology from ileocecal valve mass and a liver biopsy confirmed adenocarcinoma of colon primary.     She started FOLFOX and received 1 cycle, but then the MMR IHC testing resulted after the first cycle, which showed deficient expression of MLH1 and PMS2. She subsequently discontinued FOLFOX and started pembrolizumab 3/2024.     I personally reviewed the images of the PET CT performed on 7/24/24, which showed complete response in the liver and the ileocecal valve, but with a hypermetabolic area in the head of the pancreas. I also personally reviewed the images of the subsequent CT abd/pelvis, which do not show a definite lesion in the pancreas to correspond to the uptake on PET CT.    It is interesting that at dx  was elevated at 490; repeat 4/2024 was miniimally improved at 460. I will draw another  with her next set of labs.     She is overall tolerating pembrolizumab very well with no signs of irAEs. I discussed that I recommend continuing pembro x2 years as long as it continues to be effective with no significant toxicity.     As for the uptake in the head of pancreas, I think it is too suspicious to ignore, especially with the elevated  that did not improve with treatment of liver metastases. I discussed with her and her  that I will present her imaging at Tumor Board next week, and suspect that one of three recommendations will be made: A) close observation, B) EUS with FNA, or C) MRCP. I will reach out to them after the tumor board discussion.     8/26/24: Will proceed with Pembrolizumab today. Will establish care with either PCP or hematology for her coumadin levels, whichever one she can get into first. CT scan every 12 weeks or after every 2 cycles.     11/7/24: Feeling well, notes fatigue with pembrolizumab. I personally reviewed  the images from the recent CT, which show three small lesions in the liver. Unfortunately, the prior images from her care at [facility] in [place], [place] have been removed from our radiology archives, and I am not able to compare them to previous. Per previous reports on CT from 1/2024, there were multiple liver lesions, so this suggests improved response. CEA is normal at 1.3 and CA 19-9 is also normal at 19. I will have my team attempt to have images transferred again for comparison. Proceed with pembrolizumab    Plan:   Metastatic colon cancer of the ileocecal valve, MMR-deficient  - Continue with pembrolizumab 400mg q6wk, last dose in [place] was 7/10/24, next dose due in 6 weeks  - Pt prefers [place] due to distance from her house  - she will get labs done prior to each cycle of Pembrolizumab. She prefers to get it done peripherally.   - Proceed with Pembrolizumab today and again in 6 weeks  - CT scan every 12 weeks or after every 2 cycles   - RTC virtually the day before each cycle    PET activity in head of pancreas  - Discussed at tumor board, no CT correlate, believed to be non-specific and not related to malignancy.    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

## 2024-11-08 ENCOUNTER — LAB (OUTPATIENT)
Dept: LAB | Facility: LAB | Age: 64
End: 2024-11-08
Payer: COMMERCIAL

## 2024-11-08 DIAGNOSIS — C18.9 MALIGNANT NEOPLASM OF COLON, UNSPECIFIED PART OF COLON (MULTI): ICD-10-CM

## 2024-11-08 PROCEDURE — 85025 COMPLETE CBC W/AUTO DIFF WBC: CPT

## 2024-11-08 PROCEDURE — 82024 ASSAY OF ACTH: CPT

## 2024-11-08 PROCEDURE — 80053 COMPREHEN METABOLIC PANEL: CPT

## 2024-11-08 PROCEDURE — 84443 ASSAY THYROID STIM HORMONE: CPT

## 2024-11-08 PROCEDURE — 82533 TOTAL CORTISOL: CPT

## 2024-11-09 LAB
ALBUMIN SERPL BCP-MCNC: 4.8 G/DL (ref 3.4–5)
ALP SERPL-CCNC: 86 U/L (ref 33–136)
ALT SERPL W P-5'-P-CCNC: 20 U/L (ref 7–45)
ANION GAP SERPL CALC-SCNC: 14 MMOL/L (ref 10–20)
AST SERPL W P-5'-P-CCNC: 21 U/L (ref 9–39)
BASOPHILS # BLD AUTO: 0.05 X10*3/UL (ref 0–0.1)
BASOPHILS NFR BLD AUTO: 1.2 %
BILIRUB SERPL-MCNC: 0.5 MG/DL (ref 0–1.2)
BUN SERPL-MCNC: 12 MG/DL (ref 6–23)
CALCIUM SERPL-MCNC: 9.9 MG/DL (ref 8.6–10.6)
CHLORIDE SERPL-SCNC: 102 MMOL/L (ref 98–107)
CO2 SERPL-SCNC: 28 MMOL/L (ref 21–32)
CORTIS AM PEAK SERPL-MSCNC: 10.9 UG/DL (ref 5–20)
CREAT SERPL-MCNC: 1.06 MG/DL (ref 0.5–1.05)
EGFRCR SERPLBLD CKD-EPI 2021: 60 ML/MIN/1.73M*2
EOSINOPHIL # BLD AUTO: 0.16 X10*3/UL (ref 0–0.7)
EOSINOPHIL NFR BLD AUTO: 3.9 %
ERYTHROCYTE [DISTWIDTH] IN BLOOD BY AUTOMATED COUNT: 14.7 % (ref 11.5–14.5)
GLUCOSE SERPL-MCNC: 88 MG/DL (ref 74–99)
HCT VFR BLD AUTO: 39.7 % (ref 36–46)
HGB BLD-MCNC: 12.1 G/DL (ref 12–16)
IMM GRANULOCYTES # BLD AUTO: 0.01 X10*3/UL (ref 0–0.7)
IMM GRANULOCYTES NFR BLD AUTO: 0.2 % (ref 0–0.9)
LYMPHOCYTES # BLD AUTO: 1.38 X10*3/UL (ref 1.2–4.8)
LYMPHOCYTES NFR BLD AUTO: 34 %
MCH RBC QN AUTO: 27.7 PG (ref 26–34)
MCHC RBC AUTO-ENTMCNC: 30.5 G/DL (ref 32–36)
MCV RBC AUTO: 91 FL (ref 80–100)
MONOCYTES # BLD AUTO: 0.54 X10*3/UL (ref 0.1–1)
MONOCYTES NFR BLD AUTO: 13.3 %
NEUTROPHILS # BLD AUTO: 1.92 X10*3/UL (ref 1.2–7.7)
NEUTROPHILS NFR BLD AUTO: 47.4 %
NRBC BLD-RTO: 0 /100 WBCS (ref 0–0)
PLATELET # BLD AUTO: 296 X10*3/UL (ref 150–450)
POTASSIUM SERPL-SCNC: 4.4 MMOL/L (ref 3.5–5.3)
PROT SERPL-MCNC: 7.5 G/DL (ref 6.4–8.2)
RBC # BLD AUTO: 4.37 X10*6/UL (ref 4–5.2)
SODIUM SERPL-SCNC: 140 MMOL/L (ref 136–145)
TSH SERPL-ACNC: 2.49 MIU/L (ref 0.44–3.98)
WBC # BLD AUTO: 4.1 X10*3/UL (ref 4.4–11.3)

## 2024-11-12 LAB — ACTH PLAS-MCNC: 20 PG/ML (ref 7.2–63.3)

## 2024-11-13 ENCOUNTER — TELEPHONE (OUTPATIENT)
Dept: HEMATOLOGY/ONCOLOGY | Facility: CLINIC | Age: 64
End: 2024-11-13
Payer: COMMERCIAL

## 2024-11-13 ENCOUNTER — APPOINTMENT (OUTPATIENT)
Dept: HEMATOLOGY/ONCOLOGY | Facility: HOSPITAL | Age: 64
End: 2024-11-13
Payer: COMMERCIAL

## 2024-11-13 ENCOUNTER — APPOINTMENT (OUTPATIENT)
Dept: HEMATOLOGY/ONCOLOGY | Facility: CLINIC | Age: 64
End: 2024-11-13
Payer: COMMERCIAL

## 2024-11-13 NOTE — TELEPHONE ENCOUNTER
Patient scheduled 1 week early for her Pembro infusion.  Rescheduled her for 11/20/24 at 10:30.  Also rescheduled 12/26 appointment to 1/4.

## 2024-11-20 ENCOUNTER — PATIENT MESSAGE (OUTPATIENT)
Dept: HEMATOLOGY/ONCOLOGY | Facility: HOSPITAL | Age: 64
End: 2024-11-20

## 2024-11-20 ENCOUNTER — INFUSION (OUTPATIENT)
Dept: HEMATOLOGY/ONCOLOGY | Facility: CLINIC | Age: 64
End: 2024-11-20
Payer: COMMERCIAL

## 2024-11-20 VITALS
RESPIRATION RATE: 12 BRPM | TEMPERATURE: 97.5 F | DIASTOLIC BLOOD PRESSURE: 53 MMHG | HEART RATE: 75 BPM | OXYGEN SATURATION: 98 % | WEIGHT: 133.71 LBS | HEIGHT: 68 IN | BODY MASS INDEX: 20.26 KG/M2 | SYSTOLIC BLOOD PRESSURE: 97 MMHG

## 2024-11-20 DIAGNOSIS — C18.9 MALIGNANT NEOPLASM OF COLON, UNSPECIFIED PART OF COLON (MULTI): ICD-10-CM

## 2024-11-20 PROCEDURE — 96413 CHEMO IV INFUSION 1 HR: CPT

## 2024-11-20 PROCEDURE — 2500000004 HC RX 250 GENERAL PHARMACY W/ HCPCS (ALT 636 FOR OP/ED): Mod: JZ | Performed by: STUDENT IN AN ORGANIZED HEALTH CARE EDUCATION/TRAINING PROGRAM

## 2024-11-20 PROCEDURE — 2500000004 HC RX 250 GENERAL PHARMACY W/ HCPCS (ALT 636 FOR OP/ED): Performed by: STUDENT IN AN ORGANIZED HEALTH CARE EDUCATION/TRAINING PROGRAM

## 2024-11-20 RX ORDER — ALBUTEROL SULFATE 0.83 MG/ML
3 SOLUTION RESPIRATORY (INHALATION) AS NEEDED
Status: DISCONTINUED | OUTPATIENT
Start: 2024-11-20 | End: 2024-11-20 | Stop reason: HOSPADM

## 2024-11-20 RX ORDER — EPINEPHRINE 0.3 MG/.3ML
0.3 INJECTION SUBCUTANEOUS EVERY 5 MIN PRN
Status: DISCONTINUED | OUTPATIENT
Start: 2024-11-20 | End: 2024-11-20 | Stop reason: HOSPADM

## 2024-11-20 RX ORDER — PROCHLORPERAZINE EDISYLATE 5 MG/ML
10 INJECTION INTRAMUSCULAR; INTRAVENOUS EVERY 6 HOURS PRN
Status: DISCONTINUED | OUTPATIENT
Start: 2024-11-20 | End: 2024-11-20 | Stop reason: HOSPADM

## 2024-11-20 RX ORDER — PROCHLORPERAZINE MALEATE 10 MG
10 TABLET ORAL EVERY 6 HOURS PRN
Status: DISCONTINUED | OUTPATIENT
Start: 2024-11-20 | End: 2024-11-20 | Stop reason: HOSPADM

## 2024-11-20 RX ORDER — DIPHENHYDRAMINE HYDROCHLORIDE 50 MG/ML
50 INJECTION INTRAMUSCULAR; INTRAVENOUS AS NEEDED
Status: DISCONTINUED | OUTPATIENT
Start: 2024-11-20 | End: 2024-11-20 | Stop reason: HOSPADM

## 2024-11-20 RX ORDER — HEPARIN 100 UNIT/ML
500 SYRINGE INTRAVENOUS AS NEEDED
Status: DISCONTINUED | OUTPATIENT
Start: 2024-11-20 | End: 2024-11-20 | Stop reason: HOSPADM

## 2024-11-20 RX ORDER — HEPARIN 100 UNIT/ML
500 SYRINGE INTRAVENOUS AS NEEDED
OUTPATIENT
Start: 2024-11-20

## 2024-11-20 RX ORDER — HEPARIN SODIUM,PORCINE/PF 10 UNIT/ML
50 SYRINGE (ML) INTRAVENOUS AS NEEDED
OUTPATIENT
Start: 2024-11-20

## 2024-11-20 RX ORDER — HEPARIN SODIUM,PORCINE/PF 10 UNIT/ML
50 SYRINGE (ML) INTRAVENOUS AS NEEDED
Status: DISCONTINUED | OUTPATIENT
Start: 2024-11-20 | End: 2024-11-20 | Stop reason: HOSPADM

## 2024-11-20 RX ORDER — FAMOTIDINE 10 MG/ML
20 INJECTION INTRAVENOUS ONCE AS NEEDED
Status: DISCONTINUED | OUTPATIENT
Start: 2024-11-20 | End: 2024-11-20 | Stop reason: HOSPADM

## 2024-11-20 ASSESSMENT — PAIN SCALES - GENERAL: PAINLEVEL_OUTOF10: 0-NO PAIN

## 2024-11-22 NOTE — PROGRESS NOTES
Images from PET 7/24/24 and CT 7/26/24 from [facility] urgently requested in PACS.

## 2024-11-25 ENCOUNTER — HOSPITAL ENCOUNTER (OUTPATIENT)
Dept: RADIOLOGY | Facility: EXTERNAL LOCATION | Age: 64
Discharge: HOME | End: 2024-11-25
Payer: COMMERCIAL

## 2024-12-23 ENCOUNTER — TELEMEDICINE (OUTPATIENT)
Dept: HEMATOLOGY/ONCOLOGY | Facility: HOSPITAL | Age: 64
End: 2024-12-23
Payer: COMMERCIAL

## 2024-12-23 DIAGNOSIS — C18.9 MALIGNANT NEOPLASM OF COLON, UNSPECIFIED PART OF COLON (MULTI): Primary | ICD-10-CM

## 2024-12-23 PROCEDURE — 99213 OFFICE O/P EST LOW 20 MIN: CPT | Performed by: STUDENT IN AN ORGANIZED HEALTH CARE EDUCATION/TRAINING PROGRAM

## 2024-12-23 PROCEDURE — G2211 COMPLEX E/M VISIT ADD ON: HCPCS | Performed by: STUDENT IN AN ORGANIZED HEALTH CARE EDUCATION/TRAINING PROGRAM

## 2024-12-23 RX ORDER — PROCHLORPERAZINE EDISYLATE 5 MG/ML
10 INJECTION INTRAMUSCULAR; INTRAVENOUS EVERY 6 HOURS PRN
OUTPATIENT
Start: 2024-12-25

## 2024-12-23 RX ORDER — DIPHENHYDRAMINE HYDROCHLORIDE 50 MG/ML
50 INJECTION INTRAMUSCULAR; INTRAVENOUS AS NEEDED
OUTPATIENT
Start: 2024-12-25

## 2024-12-23 RX ORDER — ALBUTEROL SULFATE 0.83 MG/ML
3 SOLUTION RESPIRATORY (INHALATION) AS NEEDED
OUTPATIENT
Start: 2024-12-25

## 2024-12-23 RX ORDER — FAMOTIDINE 10 MG/ML
20 INJECTION INTRAVENOUS ONCE AS NEEDED
OUTPATIENT
Start: 2024-12-25

## 2024-12-23 RX ORDER — PROCHLORPERAZINE MALEATE 10 MG
10 TABLET ORAL EVERY 6 HOURS PRN
OUTPATIENT
Start: 2024-12-25

## 2024-12-23 RX ORDER — EPINEPHRINE 0.3 MG/.3ML
0.3 INJECTION SUBCUTANEOUS EVERY 5 MIN PRN
OUTPATIENT
Start: 2024-12-25

## 2024-12-23 NOTE — PROGRESS NOTES
Cancer History:  DIAGNOSIS: R-sided colon cancer     STAGING: IV    CURRENT SITES OF DISEASE:   Liver, ileocecal valve, ?head of pancreas    MOLECULAR/GENOMIC:  MMR-deficient (loss of MLH1 and PMS2  KRAS-WT  NRAS-WT  BRAF-WT    HER2 IHC equiv, FISH negative    TUMOR MARKER:   2/2024:  19, CA 19-9 492, CEA 6.2, AFP 2.1  8/24/24 CA 19-9: 21  10/3/24 CA 19-9: 19    10/3/24 CEA: 1.3      CURRENT TREATMENT:   3/6/24 -- present: pembrolizumab    PRIOR TREATMENT:   2/19/24: FOLFOX x1 cycle, d/c after MMR IHC showed deficiency    HISTORY:   January 2024: Swelling and pain in left upper extremity   1/28/2024: US arm duplex left venous showed acute DVT in the mid to distal brachial vein, and superficial venous thrombosis mid distal basilic vein. US LEs showed b/l occlusive thrombus in L proximal femoral vein through the visible left upper calf veins. CT PE  mild burden of bilateral pulmonary emboli beginning at the segmental level and extending peripherally. No CT evidence of right heart strain.  CT venogram abdomen: DVT LLE extending into superficial femoral vein. Partially visualized pulmonary emboli. Irregular bladder wall thickening. Enlarged intra-abdominal lymph nodes. Findings concerning for potential underlying malignant process.    1/29/2024: CT A/P: Multiple hypodense lesions throughout both lobes of the liver most consistent with hepatic metastatic disease. Inflammation within the adipose tissue the inferior pelvis surrounding the uterus and rectum extending to the presacral space. This is of uncertain etiology. No focal mass identified. Very small bilateral pleural effusions.    1/30/2024: R liver lesion biopsy. Path showed metastatic adenocarcinoma consistent with colorectal primary. Tumor cells positive for CK20, CDX2, SATB2. Negative for CK7, TTF 1, Napsin a, [place] 8, lara 3 and ER. Morphologic as well as immunohistochemical features consistent with metastatic colorectal carcinoma. 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 Defective mismatch repair. BRAF mutation not detected. KRAS mutation not detected. NRAS mutation not detected. HER2 negative.    2/1/2024: C-scope: arge ulcerated mass involving ileocecal valve. EGD showed normal mucosa in duodenum, stomach, esophagus. Pathology of ileocecal valve mass showed adenocarcinoma    2/19/24: FOLFOX x1. 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 FOLFOX discontinued  3/6/24: Started on pembrolizumab 400mg q6wk  7/22/24: PET CT showed no evidence of activity in the liver or colon, but with hypermetabolic focus in the posterior aspect of the pancreatic head.    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    Subjective:   Feelign well, no symptoms from treatment or from her cancer.     No fevers, chills, chest pain, shortness of breath, abdominal pain, nausea, vomiting, diarrhea, or rashes.    ROS as above. Remainder of 10-point review of systems elicited and negative.    Objective:  There were no vitals taken for this visit.    PE:  Gen: A&O, NAD  Head: Normocephalic, atraumatic  Eyes: no scleral icterus  ENT: mucous membranes moist, no oropharyngeal lesions  Resp: No respiratory distress  Neuro: CNII-XII grossly intact  Psych: appropriate mood & affect  Skin: no apparent rashes     ECOG Performance Status: 0    PET CT 7/24/24:  Hypermetabolic focus localizing to the posterior aspect of the pancreatic head within the right upper quadrant, of uncertain etiology.  Differential considerations would include a primary pancreatic neoplasm although no clear lesion is seen currently.  In  adjacent hypermetabolic peripancreatic node is also a consideration and more likely.  Further evaluation with a CT of the abdomen and pelvis with contrast may provide more information and is recommended.     CT abd/pelvis 7/26/24:  IMPRESSION:   1. No CT findings of pancreatic mass or peripancreatic lymph node at the area of concern.   2. Small low-attenuation liver lesions probably of no clinical significance.   3. Simple cyst lower pole left kidney   4. Small bilateral adnexal varices     CT C/A/P 11/4/24:  Colon cancer. Please note that no comparison imaging is available in  our system. If imaging from outside facility is obtained, please send  an epic secure chat to Cristina Dutta, and an addendum can be made to  assess for treatment response.  1. Three indeterminate liver lesions correlation with prior imaging  is recommended. Alternatively these can be further investigated with  MRI liver or PET-CT. No definite evidence for metastasis elsewhere.  2. A few areas of focal subpleural ground-glass opacities are seen in  the posterior right lower lobe, likely atelectasis, this area bears  watching on future examinations.  3. Proximal jejunal wall thickening as described above could be  related to true pathology versus hyper peristalsis and bears watching  on follow-up scan.    Assessment & Plan:   Harmony Victor is a 64 y.o. 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      I have extensively reviewed the records of her excellent care at outside cancer center. She recently moved to the [place] area from [place] and presents today to establish care.     Briefly, she presented to the ED Jan 2024 with extensive VTE in upper and lower extremities and with b/l PEs. This prompted malignancy workup, which revealed multiple metastatic lesions in the liver. C-scope showed mass in the ileocecal valve. Pathology from  ileocecal valve mass and a liver biopsy confirmed adenocarcinoma of colon primary.     She started FOLFOX and received 1 cycle, but then the MMR IHC testing resulted after the first cycle, which showed deficient expression of MLH1 and PMS2. She subsequently discontinued FOLFOX and started pembrolizumab 3/2024.     I personally reviewed the images of the PET CT performed on 7/24/24, which showed complete response in the liver and the ileocecal valve, but with a hypermetabolic area in the head of the pancreas. I also personally reviewed the images of the subsequent CT abd/pelvis, which do not show a definite lesion in the pancreas to correspond to the uptake on PET CT.    It is interesting that at dx  was elevated at 490; repeat 4/2024 was miniimally improved at 460. I will draw another  with her next set of labs.     She is overall tolerating pembrolizumab very well with no signs of irAEs. I discussed that I recommend continuing pembro x2 years as long as it continues to be effective with no significant toxicity.     As for the uptake in the head of pancreas, I think it is too suspicious to ignore, especially with the elevated  that did not improve with treatment of liver metastases. I discussed with her and her  that I will present her imaging at Tumor Board next week, and suspect that one of three recommendations will be made: A) close observation, B) EUS with FNA, or C) MRCP. I will reach out to them after the tumor board discussion.     8/26/24: Will proceed with Pembrolizumab today. Will establish care with either PCP or hematology for her coumadin levels, whichever one she can get into first. CT scan every 12 weeks or after every 2 cycles.     11/7/24: Feeling well, notes fatigue with pembrolizumab. I personally reviewed the images from the recent CT, which show three small lesions in the liver. Unfortunately, the prior images from her care at [facility] in [place], [place] have been  removed from our radiology archives, and I am not able to compare them to previous. Per previous reports on CT from 1/2024, there were multiple liver lesions, so this suggests improved response. CEA is normal at 1.3 and CA 19-9 is also normal at 19. I will have my team attempt to have images transferred again for comparison. Proceed with pembrolizumab    12/23/24: Feeling well, due for scan after next treatment, will order and see her again in 1mo.     Plan:   Metastatic colon cancer of the ileocecal valve, MMR-deficient  - Continue with pembrolizumab 400mg q6wk, last dose in [place] was 7/10/24, next dose due in 6 weeks  - Pt prefers [place] due to distance from her house  - she will get labs done prior to each cycle of Pembrolizumab. She prefers to get it done peripherally.   - Proceed with Pembrolizumab today and again in 6 weeks  - CT scan every 12 weeks or after every 2 cycles   - RTC 1mo with CT     PET activity in head of pancreas  - Discussed at tumor board, no CT correlate, believed to be non-specific and not related to malignancy.    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

## 2024-12-26 ENCOUNTER — APPOINTMENT (OUTPATIENT)
Dept: HEMATOLOGY/ONCOLOGY | Facility: CLINIC | Age: 64
End: 2024-12-26
Payer: COMMERCIAL

## 2024-12-30 ENCOUNTER — LAB (OUTPATIENT)
Dept: LAB | Facility: CLINIC | Age: 64
End: 2024-12-30
Payer: COMMERCIAL

## 2024-12-30 DIAGNOSIS — C18.9 MALIGNANT NEOPLASM OF COLON, UNSPECIFIED PART OF COLON (MULTI): ICD-10-CM

## 2024-12-30 LAB
BASOPHILS # BLD AUTO: 0.05 X10*3/UL (ref 0–0.1)
BASOPHILS NFR BLD AUTO: 1.1 %
EOSINOPHIL # BLD AUTO: 0.19 X10*3/UL (ref 0–0.7)
EOSINOPHIL NFR BLD AUTO: 4.1 %
ERYTHROCYTE [DISTWIDTH] IN BLOOD BY AUTOMATED COUNT: 13.8 % (ref 11.5–14.5)
HCT VFR BLD AUTO: 39 % (ref 36–46)
HGB BLD-MCNC: 12.4 G/DL (ref 12–16)
IMM GRANULOCYTES # BLD AUTO: 0 X10*3/UL (ref 0–0.7)
IMM GRANULOCYTES NFR BLD AUTO: 0 % (ref 0–0.9)
LYMPHOCYTES # BLD AUTO: 1.47 X10*3/UL (ref 1.2–4.8)
LYMPHOCYTES NFR BLD AUTO: 31.7 %
MCH RBC QN AUTO: 28.2 PG (ref 26–34)
MCHC RBC AUTO-ENTMCNC: 31.8 G/DL (ref 32–36)
MCV RBC AUTO: 89 FL (ref 80–100)
MONOCYTES # BLD AUTO: 0.47 X10*3/UL (ref 0.1–1)
MONOCYTES NFR BLD AUTO: 10.1 %
NEUTROPHILS # BLD AUTO: 2.46 X10*3/UL (ref 1.2–7.7)
NEUTROPHILS NFR BLD AUTO: 53 %
NRBC BLD-RTO: ABNORMAL /100{WBCS}
PLATELET # BLD AUTO: 247 X10*3/UL (ref 150–450)
RBC # BLD AUTO: 4.4 X10*6/UL (ref 4–5.2)
WBC # BLD AUTO: 4.6 X10*3/UL (ref 4.4–11.3)

## 2024-12-30 PROCEDURE — 84443 ASSAY THYROID STIM HORMONE: CPT

## 2024-12-30 PROCEDURE — 80053 COMPREHEN METABOLIC PANEL: CPT

## 2024-12-30 PROCEDURE — 82533 TOTAL CORTISOL: CPT

## 2024-12-30 PROCEDURE — 85025 COMPLETE CBC W/AUTO DIFF WBC: CPT

## 2024-12-30 PROCEDURE — 82024 ASSAY OF ACTH: CPT

## 2024-12-31 LAB
ALBUMIN SERPL BCP-MCNC: 4.6 G/DL (ref 3.4–5)
ALP SERPL-CCNC: 82 U/L (ref 33–136)
ALT SERPL W P-5'-P-CCNC: 21 U/L (ref 7–45)
ANION GAP SERPL CALC-SCNC: 14 MMOL/L (ref 10–20)
AST SERPL W P-5'-P-CCNC: 24 U/L (ref 9–39)
BILIRUB SERPL-MCNC: 0.4 MG/DL (ref 0–1.2)
BUN SERPL-MCNC: 16 MG/DL (ref 6–23)
CALCIUM SERPL-MCNC: 9.7 MG/DL (ref 8.6–10.6)
CHLORIDE SERPL-SCNC: 102 MMOL/L (ref 98–107)
CO2 SERPL-SCNC: 27 MMOL/L (ref 21–32)
CORTIS AM PEAK SERPL-MSCNC: 13.1 UG/DL (ref 5–20)
CREAT SERPL-MCNC: 0.84 MG/DL (ref 0.5–1.05)
EGFRCR SERPLBLD CKD-EPI 2021: 79 ML/MIN/1.73M*2
GLUCOSE SERPL-MCNC: 82 MG/DL (ref 74–99)
POTASSIUM SERPL-SCNC: 4.2 MMOL/L (ref 3.5–5.3)
PROT SERPL-MCNC: 7.4 G/DL (ref 6.4–8.2)
SODIUM SERPL-SCNC: 139 MMOL/L (ref 136–145)
TSH SERPL-ACNC: 1.67 MIU/L (ref 0.44–3.98)

## 2025-01-01 LAB — ACTH PLAS-MCNC: 18.6 PG/ML (ref 7.2–63.3)

## 2025-01-02 ENCOUNTER — INFUSION (OUTPATIENT)
Dept: HEMATOLOGY/ONCOLOGY | Facility: CLINIC | Age: 65
End: 2025-01-02
Payer: COMMERCIAL

## 2025-01-02 VITALS
OXYGEN SATURATION: 99 % | WEIGHT: 136.02 LBS | RESPIRATION RATE: 16 BRPM | TEMPERATURE: 97.5 F | SYSTOLIC BLOOD PRESSURE: 88 MMHG | BODY MASS INDEX: 20.62 KG/M2 | HEART RATE: 64 BPM | HEIGHT: 68 IN | DIASTOLIC BLOOD PRESSURE: 51 MMHG

## 2025-01-02 DIAGNOSIS — C18.9 MALIGNANT NEOPLASM OF COLON, UNSPECIFIED PART OF COLON (MULTI): ICD-10-CM

## 2025-01-02 PROCEDURE — 2500000004 HC RX 250 GENERAL PHARMACY W/ HCPCS (ALT 636 FOR OP/ED): Performed by: STUDENT IN AN ORGANIZED HEALTH CARE EDUCATION/TRAINING PROGRAM

## 2025-01-02 PROCEDURE — 96413 CHEMO IV INFUSION 1 HR: CPT

## 2025-01-02 RX ORDER — FAMOTIDINE 10 MG/ML
20 INJECTION INTRAVENOUS ONCE AS NEEDED
Status: DISCONTINUED | OUTPATIENT
Start: 2025-01-02 | End: 2025-01-02 | Stop reason: HOSPADM

## 2025-01-02 RX ORDER — HEPARIN SODIUM,PORCINE/PF 10 UNIT/ML
50 SYRINGE (ML) INTRAVENOUS AS NEEDED
OUTPATIENT
Start: 2025-01-02

## 2025-01-02 RX ORDER — DIPHENHYDRAMINE HYDROCHLORIDE 50 MG/ML
50 INJECTION INTRAMUSCULAR; INTRAVENOUS AS NEEDED
Status: DISCONTINUED | OUTPATIENT
Start: 2025-01-02 | End: 2025-01-02 | Stop reason: HOSPADM

## 2025-01-02 RX ORDER — HEPARIN SODIUM,PORCINE/PF 10 UNIT/ML
50 SYRINGE (ML) INTRAVENOUS AS NEEDED
Status: DISCONTINUED | OUTPATIENT
Start: 2025-01-02 | End: 2025-01-02 | Stop reason: HOSPADM

## 2025-01-02 RX ORDER — HEPARIN 100 UNIT/ML
500 SYRINGE INTRAVENOUS AS NEEDED
OUTPATIENT
Start: 2025-01-02

## 2025-01-02 RX ORDER — PROCHLORPERAZINE EDISYLATE 5 MG/ML
10 INJECTION INTRAMUSCULAR; INTRAVENOUS EVERY 6 HOURS PRN
Status: DISCONTINUED | OUTPATIENT
Start: 2025-01-02 | End: 2025-01-02 | Stop reason: HOSPADM

## 2025-01-02 RX ORDER — HEPARIN 100 UNIT/ML
500 SYRINGE INTRAVENOUS AS NEEDED
Status: DISCONTINUED | OUTPATIENT
Start: 2025-01-02 | End: 2025-01-02 | Stop reason: HOSPADM

## 2025-01-02 RX ORDER — PROCHLORPERAZINE MALEATE 10 MG
10 TABLET ORAL EVERY 6 HOURS PRN
Status: DISCONTINUED | OUTPATIENT
Start: 2025-01-02 | End: 2025-01-02 | Stop reason: HOSPADM

## 2025-01-02 RX ORDER — ALBUTEROL SULFATE 0.83 MG/ML
3 SOLUTION RESPIRATORY (INHALATION) AS NEEDED
Status: DISCONTINUED | OUTPATIENT
Start: 2025-01-02 | End: 2025-01-02 | Stop reason: HOSPADM

## 2025-01-02 RX ORDER — EPINEPHRINE 0.3 MG/.3ML
0.3 INJECTION SUBCUTANEOUS EVERY 5 MIN PRN
Status: DISCONTINUED | OUTPATIENT
Start: 2025-01-02 | End: 2025-01-02 | Stop reason: HOSPADM

## 2025-01-02 RX ADMIN — Medication 500 UNITS: at 09:58

## 2025-01-02 RX ADMIN — SODIUM CHLORIDE 400 MG: 9 INJECTION, SOLUTION INTRAVENOUS at 09:22

## 2025-01-02 ASSESSMENT — PAIN SCALES - GENERAL: PAINLEVEL_OUTOF10: 0-NO PAIN

## 2025-01-02 NOTE — SIGNIFICANT EVENT
01/02/25 0843   Prechemo Checklist   Has the patient been in the hospital, ED, or urgent care since last date of service No   Chemo/Immuno Consent Completed and Signed Yes   Protocol/Indications Verified Yes   Confirmed to previous date/time of medication Yes   Compared to previous dose Yes   All medications are dated accurately Yes   Pregnancy Test Negative Not applicable   Parameters Met Yes   Dose Calculations Verified (current, total, cumulative) Yes

## 2025-01-30 ENCOUNTER — HOSPITAL ENCOUNTER (OUTPATIENT)
Dept: RADIOLOGY | Facility: CLINIC | Age: 65
Discharge: HOME | End: 2025-01-30
Payer: COMMERCIAL

## 2025-01-30 DIAGNOSIS — C18.9 MALIGNANT NEOPLASM OF COLON, UNSPECIFIED PART OF COLON (MULTI): ICD-10-CM

## 2025-01-30 PROCEDURE — 2550000001 HC RX 255 CONTRASTS: Performed by: STUDENT IN AN ORGANIZED HEALTH CARE EDUCATION/TRAINING PROGRAM

## 2025-01-30 PROCEDURE — 74177 CT ABD & PELVIS W/CONTRAST: CPT

## 2025-01-30 RX ADMIN — IOHEXOL 75 ML: 350 INJECTION, SOLUTION INTRAVENOUS at 09:10

## 2025-02-03 ENCOUNTER — TELEMEDICINE (OUTPATIENT)
Dept: HEMATOLOGY/ONCOLOGY | Facility: HOSPITAL | Age: 65
End: 2025-02-03
Payer: COMMERCIAL

## 2025-02-03 DIAGNOSIS — C18.9 MALIGNANT NEOPLASM OF COLON, UNSPECIFIED PART OF COLON (MULTI): Primary | ICD-10-CM

## 2025-02-03 PROCEDURE — 99215 OFFICE O/P EST HI 40 MIN: CPT | Performed by: STUDENT IN AN ORGANIZED HEALTH CARE EDUCATION/TRAINING PROGRAM

## 2025-02-03 PROCEDURE — G2211 COMPLEX E/M VISIT ADD ON: HCPCS | Performed by: STUDENT IN AN ORGANIZED HEALTH CARE EDUCATION/TRAINING PROGRAM

## 2025-02-03 RX ORDER — FAMOTIDINE 10 MG/ML
20 INJECTION INTRAVENOUS ONCE AS NEEDED
OUTPATIENT
Start: 2025-02-05

## 2025-02-03 RX ORDER — PROCHLORPERAZINE EDISYLATE 5 MG/ML
10 INJECTION INTRAMUSCULAR; INTRAVENOUS EVERY 6 HOURS PRN
OUTPATIENT
Start: 2025-02-05

## 2025-02-03 RX ORDER — ALBUTEROL SULFATE 0.83 MG/ML
3 SOLUTION RESPIRATORY (INHALATION) AS NEEDED
OUTPATIENT
Start: 2025-02-05

## 2025-02-03 RX ORDER — DIPHENHYDRAMINE HYDROCHLORIDE 50 MG/ML
50 INJECTION INTRAMUSCULAR; INTRAVENOUS AS NEEDED
OUTPATIENT
Start: 2025-02-05

## 2025-02-03 RX ORDER — PROCHLORPERAZINE MALEATE 10 MG
10 TABLET ORAL EVERY 6 HOURS PRN
OUTPATIENT
Start: 2025-02-05

## 2025-02-03 RX ORDER — EPINEPHRINE 0.3 MG/.3ML
0.3 INJECTION SUBCUTANEOUS EVERY 5 MIN PRN
OUTPATIENT
Start: 2025-02-05

## 2025-02-03 NOTE — PROGRESS NOTES
Cancer History:  DIAGNOSIS: R-sided colon cancer     STAGING: IV    CURRENT SITES OF DISEASE:   Liver, ileocecal valve, ?head of pancreas    MOLECULAR/GENOMIC:  MMR-deficient (loss of MLH1 and PMS2  KRAS-WT  NRAS-WT  BRAF-WT    HER2 IHC equiv, FISH negative    TUMOR MARKER:   2/2024:  19, CA 19-9 492, CEA 6.2, AFP 2.1  8/24/24 CA 19-9: 21  10/3/24 CA 19-9: 19    10/3/24 CEA: 1.3      CURRENT TREATMENT:   3/6/24 -- present: pembrolizumab    PRIOR TREATMENT:   2/19/24: FOLFOX x1 cycle, d/c after MMR IHC showed deficiency    HISTORY:   January 2024: Swelling and pain in left upper extremity   1/28/2024: US arm duplex left venous showed acute DVT in the mid to distal brachial vein, and superficial venous thrombosis mid distal basilic vein. US LEs showed b/l occlusive thrombus in L proximal femoral vein through the visible left upper calf veins. CT PE  mild burden of bilateral pulmonary emboli beginning at the segmental level and extending peripherally. No CT evidence of right heart strain.  CT venogram abdomen: DVT LLE extending into superficial femoral vein. Partially visualized pulmonary emboli. Irregular bladder wall thickening. Enlarged intra-abdominal lymph nodes. Findings concerning for potential underlying malignant process.    1/29/2024: CT A/P: Multiple hypodense lesions throughout both lobes of the liver most consistent with hepatic metastatic disease. Inflammation within the adipose tissue the inferior pelvis surrounding the uterus and rectum extending to the presacral space. This is of uncertain etiology. No focal mass identified. Very small bilateral pleural effusions.    1/30/2024: R liver lesion biopsy. Path showed metastatic adenocarcinoma consistent with colorectal primary. Tumor cells positive for CK20, CDX2, SATB2. Negative for CK7, TTF 1, Napsin a, [place] 8, lara 3 and ER. Morphologic as well as immunohistochemical features consistent with metastatic colorectal carcinoma. 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 Defective mismatch repair. BRAF mutation not detected. KRAS mutation not detected. NRAS mutation not detected. HER2 negative.    2/1/2024: C-scope: arge ulcerated mass involving ileocecal valve. EGD showed normal mucosa in duodenum, stomach, esophagus. Pathology of ileocecal valve mass showed adenocarcinoma    2/19/24: FOLFOX x1. 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 FOLFOX discontinued  3/6/24: Started on pembrolizumab 400mg q6wk  7/22/24: PET CT showed no evidence of activity in the liver or colon, but with hypermetabolic focus in the posterior aspect of the pancreatic head.    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    Subjective:   Feelign well, no symptoms from treatment or from her cancer.     No fevers, chills, chest pain, shortness of breath, abdominal pain, nausea, vomiting, diarrhea, or rashes.    ROS as above. Remainder of 10-point review of systems elicited and negative.    Objective:  There were no vitals taken for this visit.    PE:  Deferred d/t telephone encounter    ECOG Performance Status: 0      CT C/A/P 11/4/24:  Known hepatic metastasis are similar to decreased in size from prior  examination      Minimal bibasilar atelectasis. Right lower lobe nodular opacity  appears to be somewhat more confluence probably relating to  atelectasis but short-term follow-up is advised to exclude this is  not a developing pulmonary nodule. Short-term follow-up is advised       Constipation.    Assessment & Plan:   Harmony Victor is a 64 y.o. female with metastatic R-sided colon cancer (ileocecal  valve) to the liver, MMR-deficient (MLH1 and PMS2) dx 2/2024 after presenting with extensive VTE.      I have extensively reviewed the records of her excellent care at outside cancer center. She recently moved to the [place] area from [place] and presents today to establish care.     Briefly, she presented to the ED Jan 2024 with extensive VTE in upper and lower extremities and with b/l PEs. This prompted malignancy workup, which revealed multiple metastatic lesions in the liver. C-scope showed mass in the ileocecal valve. Pathology from ileocecal valve mass and a liver biopsy confirmed adenocarcinoma of colon primary.     She started FOLFOX and received 1 cycle, but then the MMR IHC testing resulted after the first cycle, which showed deficient expression of MLH1 and PMS2. She subsequently discontinued FOLFOX and started pembrolizumab 3/2024.     I personally reviewed the images of the PET CT performed on 7/24/24, which showed complete response in the liver and the ileocecal valve, but with a hypermetabolic area in the head of the pancreas. I also personally reviewed the images of the subsequent CT abd/pelvis, which do not show a definite lesion in the pancreas to correspond to the uptake on PET CT.    It is interesting that at dx  was elevated at 490; repeat 4/2024 was miniimally improved at 460. I will draw another  with her next set of labs.     She is overall tolerating pembrolizumab very well with no signs of irAEs. I discussed that I recommend continuing pembro x2 years as long as it continues to be effective with no significant toxicity.     As for the uptake in the head of pancreas, I think it is too suspicious to ignore, especially with the elevated  that did not improve with treatment of liver metastases. I discussed with her and her  that I will present her imaging at Tumor Board next week, and suspect that one of three recommendations will be made: A) close observation, B)  EUS with FNA, or C) MRCP. I will reach out to them after the tumor board discussion.     8/26/24: Will proceed with Pembrolizumab today. Will establish care with either PCP or hematology for her coumadin levels, whichever one she can get into first. CT scan every 12 weeks or after every 2 cycles.     11/7/24: Feeling well, notes fatigue with pembrolizumab. I personally reviewed the images from the recent CT, which show three small lesions in the liver. Unfortunately, the prior images from her care at [facility] in [place], [place] have been removed from our radiology archives, and I am not able to compare them to previous. Per previous reports on CT from 1/2024, there were multiple liver lesions, so this suggests improved response. CEA is normal at 1.3 and CA 19-9 is also normal at 19. I will have my team attempt to have images transferred again for comparison. Proceed with pembrolizumab    12/23/24: Feeling well, due for scan after next treatment, will order and see her again in 1mo.     2/3/25: I personally reviewed the images from the recent CT, which show ongoing decreased size of liver lesions, suggesting good response to immunotherapy.   Pt requesting to be on another blood thinner than warfarin; recalls that she had never had progression of blood clots on Lovenox or prior blood thinners. Since her cancer is now controlled, even if she had had progression of blood clots previously, I suspect that the underlying source of hypercoagulability is now under much better control, and a DOAC is reasonable. Will send in rx for Eliquis to her pharmacy, she will see about the cost, and let us know if it is too expensive.     RTC with next cycle     Plan:   Metastatic colon cancer of the ileocecal valve, MMR-deficient  - Continue with pembrolizumab 400mg q6wk, last dose in [place] was 7/10/24, next dose due in 6 weeks  - Pt prefers [place] due to distance from her house  - she will get labs done prior to each  cycle of Pembrolizumab. She prefers to get it done peripherally.   - Proceed with Pembrolizumab this week and again in 6 weeks  - CT scan every 12 weeks or after every 2 cycles   - RTC 6 weeks with next cycle    PET activity in head of pancreas  - Discussed at tumor board, no CT correlate, believed to be non-specific and not related to malignancy.    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

## 2025-02-05 ENCOUNTER — APPOINTMENT (OUTPATIENT)
Dept: HEMATOLOGY/ONCOLOGY | Facility: CLINIC | Age: 65
End: 2025-02-05
Payer: COMMERCIAL

## 2025-02-06 ENCOUNTER — SPECIALTY PHARMACY (OUTPATIENT)
Dept: PHARMACY | Facility: CLINIC | Age: 65
End: 2025-02-06

## 2025-02-06 DIAGNOSIS — C18.9 MALIGNANT NEOPLASM OF COLON, UNSPECIFIED PART OF COLON (MULTI): ICD-10-CM

## 2025-02-06 PROCEDURE — RXMED WILLOW AMBULATORY MEDICATION CHARGE

## 2025-02-07 ENCOUNTER — PHARMACY VISIT (OUTPATIENT)
Dept: PHARMACY | Facility: CLINIC | Age: 65
End: 2025-02-07
Payer: COMMERCIAL

## 2025-02-10 ENCOUNTER — LAB (OUTPATIENT)
Dept: LAB | Facility: CLINIC | Age: 65
End: 2025-02-10
Payer: COMMERCIAL

## 2025-02-10 DIAGNOSIS — C18.9 MALIGNANT NEOPLASM OF COLON, UNSPECIFIED PART OF COLON (MULTI): ICD-10-CM

## 2025-02-10 LAB
BASOPHILS # BLD AUTO: 0.05 X10*3/UL (ref 0–0.1)
BASOPHILS NFR BLD AUTO: 0.8 %
EOSINOPHIL # BLD AUTO: 0.2 X10*3/UL (ref 0–0.7)
EOSINOPHIL NFR BLD AUTO: 3.3 %
ERYTHROCYTE [DISTWIDTH] IN BLOOD BY AUTOMATED COUNT: 14.3 % (ref 11.5–14.5)
HCT VFR BLD AUTO: 38.5 % (ref 36–46)
HGB BLD-MCNC: 12.3 G/DL (ref 12–16)
IMM GRANULOCYTES # BLD AUTO: 0 X10*3/UL (ref 0–0.7)
IMM GRANULOCYTES NFR BLD AUTO: 0 % (ref 0–0.9)
LYMPHOCYTES # BLD AUTO: 1.47 X10*3/UL (ref 1.2–4.8)
LYMPHOCYTES NFR BLD AUTO: 24.3 %
MCH RBC QN AUTO: 28.1 PG (ref 26–34)
MCHC RBC AUTO-ENTMCNC: 31.9 G/DL (ref 32–36)
MCV RBC AUTO: 88 FL (ref 80–100)
MONOCYTES # BLD AUTO: 0.49 X10*3/UL (ref 0.1–1)
MONOCYTES NFR BLD AUTO: 8.1 %
NEUTROPHILS # BLD AUTO: 3.84 X10*3/UL (ref 1.2–7.7)
NEUTROPHILS NFR BLD AUTO: 63.5 %
NRBC BLD-RTO: ABNORMAL /100{WBCS}
PLATELET # BLD AUTO: 258 X10*3/UL (ref 150–450)
RBC # BLD AUTO: 4.37 X10*6/UL (ref 4–5.2)
WBC # BLD AUTO: 6.1 X10*3/UL (ref 4.4–11.3)

## 2025-02-10 PROCEDURE — 85025 COMPLETE CBC W/AUTO DIFF WBC: CPT

## 2025-02-10 PROCEDURE — 84443 ASSAY THYROID STIM HORMONE: CPT

## 2025-02-10 PROCEDURE — 82533 TOTAL CORTISOL: CPT

## 2025-02-10 PROCEDURE — 80053 COMPREHEN METABOLIC PANEL: CPT

## 2025-02-10 PROCEDURE — 82024 ASSAY OF ACTH: CPT

## 2025-02-11 LAB
ALBUMIN SERPL BCP-MCNC: 4.5 G/DL (ref 3.4–5)
ALP SERPL-CCNC: 88 U/L (ref 33–136)
ALT SERPL W P-5'-P-CCNC: 22 U/L (ref 7–45)
ANION GAP SERPL CALC-SCNC: 12 MMOL/L (ref 10–20)
AST SERPL W P-5'-P-CCNC: 23 U/L (ref 9–39)
BILIRUB SERPL-MCNC: 0.4 MG/DL (ref 0–1.2)
BUN SERPL-MCNC: 13 MG/DL (ref 6–23)
CALCIUM SERPL-MCNC: 9.8 MG/DL (ref 8.6–10.6)
CHLORIDE SERPL-SCNC: 103 MMOL/L (ref 98–107)
CO2 SERPL-SCNC: 28 MMOL/L (ref 21–32)
CORTIS AM PEAK SERPL-MSCNC: 12.3 UG/DL (ref 5–20)
CREAT SERPL-MCNC: 0.89 MG/DL (ref 0.5–1.05)
EGFRCR SERPLBLD CKD-EPI 2021: 73 ML/MIN/1.73M*2
GLUCOSE SERPL-MCNC: 83 MG/DL (ref 74–99)
POTASSIUM SERPL-SCNC: 4.2 MMOL/L (ref 3.5–5.3)
PROT SERPL-MCNC: 7.2 G/DL (ref 6.4–8.2)
SODIUM SERPL-SCNC: 139 MMOL/L (ref 136–145)
TSH SERPL-ACNC: 2.96 MIU/L (ref 0.44–3.98)

## 2025-02-12 ENCOUNTER — INFUSION (OUTPATIENT)
Dept: HEMATOLOGY/ONCOLOGY | Facility: CLINIC | Age: 65
End: 2025-02-12
Payer: COMMERCIAL

## 2025-02-12 VITALS
SYSTOLIC BLOOD PRESSURE: 98 MMHG | DIASTOLIC BLOOD PRESSURE: 61 MMHG | TEMPERATURE: 97.2 F | OXYGEN SATURATION: 98 % | WEIGHT: 136.13 LBS | HEART RATE: 79 BPM | RESPIRATION RATE: 14 BRPM | BODY MASS INDEX: 20.63 KG/M2 | HEIGHT: 68 IN

## 2025-02-12 DIAGNOSIS — C18.9 MALIGNANT NEOPLASM OF COLON, UNSPECIFIED PART OF COLON (MULTI): ICD-10-CM

## 2025-02-12 LAB — ACTH PLAS-MCNC: 17.1 PG/ML (ref 7.2–63.3)

## 2025-02-12 PROCEDURE — 2500000004 HC RX 250 GENERAL PHARMACY W/ HCPCS (ALT 636 FOR OP/ED): Mod: JZ | Performed by: STUDENT IN AN ORGANIZED HEALTH CARE EDUCATION/TRAINING PROGRAM

## 2025-02-12 PROCEDURE — 96413 CHEMO IV INFUSION 1 HR: CPT

## 2025-02-12 PROCEDURE — 2500000004 HC RX 250 GENERAL PHARMACY W/ HCPCS (ALT 636 FOR OP/ED): Performed by: STUDENT IN AN ORGANIZED HEALTH CARE EDUCATION/TRAINING PROGRAM

## 2025-02-12 RX ORDER — PROCHLORPERAZINE MALEATE 10 MG
10 TABLET ORAL EVERY 6 HOURS PRN
Status: DISCONTINUED | OUTPATIENT
Start: 2025-02-12 | End: 2025-02-12 | Stop reason: HOSPADM

## 2025-02-12 RX ORDER — FAMOTIDINE 10 MG/ML
20 INJECTION INTRAVENOUS ONCE AS NEEDED
Status: DISCONTINUED | OUTPATIENT
Start: 2025-02-12 | End: 2025-02-12 | Stop reason: HOSPADM

## 2025-02-12 RX ORDER — HEPARIN 100 UNIT/ML
500 SYRINGE INTRAVENOUS AS NEEDED
OUTPATIENT
Start: 2025-02-12

## 2025-02-12 RX ORDER — PROCHLORPERAZINE EDISYLATE 5 MG/ML
10 INJECTION INTRAMUSCULAR; INTRAVENOUS EVERY 6 HOURS PRN
Status: DISCONTINUED | OUTPATIENT
Start: 2025-02-12 | End: 2025-02-12 | Stop reason: HOSPADM

## 2025-02-12 RX ORDER — DIPHENHYDRAMINE HYDROCHLORIDE 50 MG/ML
50 INJECTION INTRAMUSCULAR; INTRAVENOUS AS NEEDED
Status: DISCONTINUED | OUTPATIENT
Start: 2025-02-12 | End: 2025-02-12 | Stop reason: HOSPADM

## 2025-02-12 RX ORDER — ALBUTEROL SULFATE 0.83 MG/ML
3 SOLUTION RESPIRATORY (INHALATION) AS NEEDED
Status: DISCONTINUED | OUTPATIENT
Start: 2025-02-12 | End: 2025-02-12 | Stop reason: HOSPADM

## 2025-02-12 RX ORDER — EPINEPHRINE 0.3 MG/.3ML
0.3 INJECTION SUBCUTANEOUS EVERY 5 MIN PRN
Status: DISCONTINUED | OUTPATIENT
Start: 2025-02-12 | End: 2025-02-12 | Stop reason: HOSPADM

## 2025-02-12 RX ORDER — HEPARIN SODIUM,PORCINE/PF 10 UNIT/ML
50 SYRINGE (ML) INTRAVENOUS AS NEEDED
OUTPATIENT
Start: 2025-02-12

## 2025-02-12 RX ORDER — HEPARIN SODIUM,PORCINE/PF 10 UNIT/ML
50 SYRINGE (ML) INTRAVENOUS AS NEEDED
Status: DISCONTINUED | OUTPATIENT
Start: 2025-02-12 | End: 2025-02-12 | Stop reason: HOSPADM

## 2025-02-12 RX ORDER — HEPARIN 100 UNIT/ML
500 SYRINGE INTRAVENOUS AS NEEDED
Status: DISCONTINUED | OUTPATIENT
Start: 2025-02-12 | End: 2025-02-12 | Stop reason: HOSPADM

## 2025-02-12 RX ADMIN — SODIUM CHLORIDE 400 MG: 9 INJECTION, SOLUTION INTRAVENOUS at 11:51

## 2025-02-12 RX ADMIN — HEPARIN 500 UNITS: 100 SYRINGE at 12:27

## 2025-02-12 ASSESSMENT — PAIN SCALES - GENERAL: PAINLEVEL_OUTOF10: 0-NO PAIN

## 2025-02-12 NOTE — PROGRESS NOTES
Patient in clinic for Pembrolizumab infusion. Tolerated without issue. Denied problem or concern. DC to home in stable condition

## 2025-02-12 NOTE — PROGRESS NOTES
PATIENT EDUCATION:  Learner: patient  Educated on: plan of care. Pembrolizumab   Readiness: acceptance  Preferred learning method: preferred: listening  Method used: explanation  Response: demonstrated understanding  Barriers: None  Preferred language: English

## 2025-02-26 ENCOUNTER — TELEPHONE (OUTPATIENT)
Dept: ADMISSION | Facility: HOSPITAL | Age: 65
End: 2025-02-26
Payer: COMMERCIAL

## 2025-02-26 NOTE — TELEPHONE ENCOUNTER
Patients PCP office called, spoke with Christine.  Patient was advised by Dr. Hayes to stop Warfarin and transfer to [facility].  PCP is currently out of the country.  INR drawn today was 1.1.  Patient has aches in left thigh where she previously had clot, no SOB. No redness/swelling noticed by staff.      She has been taking Equis 5mg BID x5 days.  Wants to confirm INR is okay? And anything recommended with left thigh pain?    Next FUV 3/17/25.

## 2025-02-26 NOTE — TELEPHONE ENCOUNTER
RN called PCP office and spoke with Christine at office.  Advised Christine that Dr. Hayes has left facility and patient has not had new patient visit yet with new provider.  Advised PCP office needs to follow up with issues.  Christine verbalized understanding and states PCP office will handle this issue.

## 2025-03-03 ENCOUNTER — SPECIALTY PHARMACY (OUTPATIENT)
Dept: PHARMACY | Facility: CLINIC | Age: 65
End: 2025-03-03

## 2025-03-03 PROCEDURE — RXMED WILLOW AMBULATORY MEDICATION CHARGE

## 2025-03-05 ENCOUNTER — PHARMACY VISIT (OUTPATIENT)
Dept: PHARMACY | Facility: CLINIC | Age: 65
End: 2025-03-05
Payer: COMMERCIAL

## 2025-03-17 ENCOUNTER — TELEMEDICINE (OUTPATIENT)
Dept: HEMATOLOGY/ONCOLOGY | Facility: CLINIC | Age: 65
End: 2025-03-17
Payer: COMMERCIAL

## 2025-03-17 DIAGNOSIS — C18.9 MALIGNANT NEOPLASM OF COLON, UNSPECIFIED PART OF COLON (MULTI): Primary | ICD-10-CM

## 2025-03-17 PROCEDURE — 99214 OFFICE O/P EST MOD 30 MIN: CPT | Performed by: INTERNAL MEDICINE

## 2025-03-17 RX ORDER — PROCHLORPERAZINE EDISYLATE 5 MG/ML
10 INJECTION INTRAMUSCULAR; INTRAVENOUS EVERY 6 HOURS PRN
OUTPATIENT
Start: 2025-03-19

## 2025-03-17 RX ORDER — FAMOTIDINE 10 MG/ML
20 INJECTION, SOLUTION INTRAVENOUS ONCE AS NEEDED
OUTPATIENT
Start: 2025-03-19

## 2025-03-17 RX ORDER — EPINEPHRINE 0.3 MG/.3ML
0.3 INJECTION SUBCUTANEOUS EVERY 5 MIN PRN
OUTPATIENT
Start: 2025-03-19

## 2025-03-17 RX ORDER — PROCHLORPERAZINE MALEATE 10 MG
10 TABLET ORAL EVERY 6 HOURS PRN
OUTPATIENT
Start: 2025-03-19

## 2025-03-17 RX ORDER — ALBUTEROL SULFATE 0.83 MG/ML
3 SOLUTION RESPIRATORY (INHALATION) AS NEEDED
OUTPATIENT
Start: 2025-03-19

## 2025-03-17 RX ORDER — DIPHENHYDRAMINE HYDROCHLORIDE 50 MG/ML
50 INJECTION, SOLUTION INTRAMUSCULAR; INTRAVENOUS AS NEEDED
OUTPATIENT
Start: 2025-03-19

## 2025-03-17 NOTE — PROGRESS NOTES
This was a telephone visit.  Consent obtained.      Diagnosis :  R-sided colon cancer with mets to liver and likely head of pancreas, deficient mismatch repair, started single and pembrolizumab in March 2024.    Genomic studies:  MMR-deficient (loss of MLH1 and PMS2  KRAS-WT  NRAS-WT  BRAF-WT  HER2 IHC equiv, FISH negative    TUMOR MARKER:   2/2024:  19, CA 19-9 492, CEA 6.2, AFP 2.1  8/24/24 CA 19-9: 21  10/3/24 CA 19-9: 19  10/3/24 CEA: 1.3    Assessment and plan:  This is a patient of Dr. Hayes.  I have reviewed her entire electronic chart to gather information on her condition.  Harmony is a pleasant 64-year-old woman who had a diagnosis of metastatic colon cancer in [place].  Her tumor was found to be deficient mismatch repair.  She received 1 cycle of FOLFOX followed by pembrolizumab which she started in March 2024.  She has received pembrolizumab for about a year now without any significant difficulties.  Her last CT scan in January 2025 showed stable disease.    Plan: Continue pembrolizumab.    I have ordered pembrolizumab to be administered on March 26, 2025.  I ordered a CT scan for April 25, 2025.    Follow-up plan: In person appointment on April 28, 2025 in [place].  CT scan on April 25, 2025.  Labs on April 25,2025    HISTORY:   January 2024: Swelling and pain in left upper extremity   1/28/2024: US arm duplex left venous showed acute DVT in the mid to distal brachial vein, and superficial venous thrombosis mid distal basilic vein. US LEs showed b/l occlusive thrombus in L proximal femoral vein through the visible left upper calf veins. CT PE  mild burden of bilateral pulmonary emboli beginning at the segmental level and extending peripherally. No CT evidence of right heart strain.  CT venogram abdomen: DVT LLE extending into superficial femoral vein. Partially visualized pulmonary emboli. Irregular bladder wall thickening. Enlarged intra-abdominal lymph nodes. Findings concerning for potential  underlying malignant process.    1/29/2024: CT A/P: Multiple hypodense lesions throughout both lobes of the liver most consistent with hepatic metastatic disease. Inflammation within the adipose tissue the inferior pelvis surrounding the uterus and rectum extending to the presacral space. This is of uncertain etiology. No focal mass identified. Very small bilateral pleural effusions.    1/30/2024: R liver lesion biopsy. Path showed metastatic adenocarcinoma consistent with colorectal primary. Tumor cells positive for CK20, CDX2, SATB2. Negative for CK7, TTF 1, Napsin a, [place] 8, lara 3 and ER. Morphologic as well as immunohistochemical features consistent with metastatic colorectal carcinoma. 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 Defective mismatch repair. BRAF mutation not detected. KRAS mutation not detected. NRAS mutation not detected. HER2 negative.    2/1/2024: C-scope: arge ulcerated mass involving ileocecal valve. EGD showed normal mucosa in duodenum, stomach, esophagus. Pathology of ileocecal valve mass showed adenocarcinoma    2/19/24: FOLFOX x1. 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 FOLFOX discontinued  3/6/24: Started on pembrolizumab 400mg q6wk    7/22/24: PET CT showed no evidence of activity in the liver or colon, but with hypermetabolic focus in the posterior aspect of the pancreatic head.    January 2025: CT scan showed stable disease.    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    Subjective:   Feelign well, no symptoms from treatment or from her cancer except moderate fatigue.    No fevers,  chills, chest pain, shortness of breath, abdominal pain, nausea, vomiting, diarrhea, or rashes.    ROS as above. Remainder of 10-point review of systems elicited and negative.    Objective:  There were no vitals taken for this visit.    PE:  Deferred d/t telephone encounter    ECOG Performance Status: 0    CT C/A/P 11/4/24:  Known hepatic metastasis are similar to decreased in size from prior  examination      Minimal bibasilar atelectasis. Right lower lobe nodular opacity  appears to be somewhat more confluence probably relating to  atelectasis but short-term follow-up is advised to exclude this is  not a developing pulmonary nodule. Short-term follow-up is advised.    Total time spent 30 minutes that included review of her medical records.    Jaci Rosado MD.

## 2025-03-19 ENCOUNTER — APPOINTMENT (OUTPATIENT)
Dept: HEMATOLOGY/ONCOLOGY | Facility: CLINIC | Age: 65
End: 2025-03-19
Payer: COMMERCIAL

## 2025-03-24 ENCOUNTER — LAB (OUTPATIENT)
Dept: LAB | Facility: CLINIC | Age: 65
End: 2025-03-24
Payer: COMMERCIAL

## 2025-03-24 DIAGNOSIS — C18.9 MALIGNANT NEOPLASM OF COLON, UNSPECIFIED PART OF COLON (MULTI): ICD-10-CM

## 2025-03-24 LAB
BASOPHILS # BLD AUTO: 0.04 X10*3/UL (ref 0–0.1)
BASOPHILS NFR BLD AUTO: 0.9 %
EOSINOPHIL # BLD AUTO: 0.17 X10*3/UL (ref 0–0.7)
EOSINOPHIL NFR BLD AUTO: 3.9 %
ERYTHROCYTE [DISTWIDTH] IN BLOOD BY AUTOMATED COUNT: 13.9 % (ref 11.5–14.5)
HCT VFR BLD AUTO: 37.7 % (ref 36–46)
HGB BLD-MCNC: 12.3 G/DL (ref 12–16)
IMM GRANULOCYTES # BLD AUTO: 0.01 X10*3/UL (ref 0–0.7)
IMM GRANULOCYTES NFR BLD AUTO: 0.2 % (ref 0–0.9)
LYMPHOCYTES # BLD AUTO: 1.17 X10*3/UL (ref 1.2–4.8)
LYMPHOCYTES NFR BLD AUTO: 26.7 %
MCH RBC QN AUTO: 28.7 PG (ref 26–34)
MCHC RBC AUTO-ENTMCNC: 32.6 G/DL (ref 32–36)
MCV RBC AUTO: 88 FL (ref 80–100)
MONOCYTES # BLD AUTO: 0.45 X10*3/UL (ref 0.1–1)
MONOCYTES NFR BLD AUTO: 10.3 %
NEUTROPHILS # BLD AUTO: 2.54 X10*3/UL (ref 1.2–7.7)
NEUTROPHILS NFR BLD AUTO: 58 %
NRBC BLD-RTO: ABNORMAL /100{WBCS}
PLATELET # BLD AUTO: 241 X10*3/UL (ref 150–450)
RBC # BLD AUTO: 4.29 X10*6/UL (ref 4–5.2)
WBC # BLD AUTO: 4.4 X10*3/UL (ref 4.4–11.3)

## 2025-03-24 PROCEDURE — 84443 ASSAY THYROID STIM HORMONE: CPT

## 2025-03-24 PROCEDURE — 82533 TOTAL CORTISOL: CPT

## 2025-03-24 PROCEDURE — 84075 ASSAY ALKALINE PHOSPHATASE: CPT

## 2025-03-24 PROCEDURE — 82024 ASSAY OF ACTH: CPT

## 2025-03-24 PROCEDURE — 85025 COMPLETE CBC W/AUTO DIFF WBC: CPT

## 2025-03-25 LAB
ALBUMIN SERPL BCP-MCNC: 4.6 G/DL (ref 3.4–5)
ALP SERPL-CCNC: 90 U/L (ref 33–136)
ALT SERPL W P-5'-P-CCNC: 15 U/L (ref 7–45)
ANION GAP SERPL CALC-SCNC: 15 MMOL/L (ref 10–20)
AST SERPL W P-5'-P-CCNC: 20 U/L (ref 9–39)
BILIRUB SERPL-MCNC: 0.4 MG/DL (ref 0–1.2)
BUN SERPL-MCNC: 13 MG/DL (ref 6–23)
CALCIUM SERPL-MCNC: 9.7 MG/DL (ref 8.6–10.6)
CHLORIDE SERPL-SCNC: 102 MMOL/L (ref 98–107)
CO2 SERPL-SCNC: 27 MMOL/L (ref 21–32)
CORTIS AM PEAK SERPL-MSCNC: 11.7 UG/DL (ref 5–20)
CREAT SERPL-MCNC: 0.85 MG/DL (ref 0.5–1.05)
EGFRCR SERPLBLD CKD-EPI 2021: 78 ML/MIN/1.73M*2
GLUCOSE SERPL-MCNC: 81 MG/DL (ref 74–99)
POTASSIUM SERPL-SCNC: 4.3 MMOL/L (ref 3.5–5.3)
PROT SERPL-MCNC: 7.2 G/DL (ref 6.4–8.2)
SODIUM SERPL-SCNC: 140 MMOL/L (ref 136–145)
TSH SERPL-ACNC: 1.35 MIU/L (ref 0.44–3.98)

## 2025-03-26 ENCOUNTER — SPECIALTY PHARMACY (OUTPATIENT)
Dept: PHARMACY | Facility: CLINIC | Age: 65
End: 2025-03-26

## 2025-03-26 ENCOUNTER — INFUSION (OUTPATIENT)
Dept: HEMATOLOGY/ONCOLOGY | Facility: CLINIC | Age: 65
End: 2025-03-26
Payer: COMMERCIAL

## 2025-03-26 VITALS
RESPIRATION RATE: 16 BRPM | SYSTOLIC BLOOD PRESSURE: 98 MMHG | WEIGHT: 135.47 LBS | HEART RATE: 72 BPM | BODY MASS INDEX: 20.53 KG/M2 | TEMPERATURE: 97 F | DIASTOLIC BLOOD PRESSURE: 65 MMHG | OXYGEN SATURATION: 99 % | HEIGHT: 68 IN

## 2025-03-26 DIAGNOSIS — C18.9 MALIGNANT NEOPLASM OF COLON, UNSPECIFIED PART OF COLON (MULTI): ICD-10-CM

## 2025-03-26 LAB — ACTH PLAS-MCNC: 13.9 PG/ML (ref 7.2–63.3)

## 2025-03-26 PROCEDURE — 2500000004 HC RX 250 GENERAL PHARMACY W/ HCPCS (ALT 636 FOR OP/ED): Performed by: INTERNAL MEDICINE

## 2025-03-26 PROCEDURE — 96413 CHEMO IV INFUSION 1 HR: CPT

## 2025-03-26 PROCEDURE — 2500000004 HC RX 250 GENERAL PHARMACY W/ HCPCS (ALT 636 FOR OP/ED)

## 2025-03-26 RX ORDER — HEPARIN 100 UNIT/ML
5 SYRINGE INTRAVENOUS ONCE AS NEEDED
Status: COMPLETED | OUTPATIENT
Start: 2025-03-26 | End: 2025-03-26

## 2025-03-26 RX ORDER — PROCHLORPERAZINE MALEATE 10 MG
10 TABLET ORAL EVERY 6 HOURS PRN
Status: DISCONTINUED | OUTPATIENT
Start: 2025-03-26 | End: 2025-03-26 | Stop reason: HOSPADM

## 2025-03-26 RX ORDER — HEPARIN SODIUM,PORCINE/PF 10 UNIT/ML
50 SYRINGE (ML) INTRAVENOUS AS NEEDED
OUTPATIENT
Start: 2025-03-26

## 2025-03-26 RX ORDER — PROCHLORPERAZINE EDISYLATE 5 MG/ML
10 INJECTION INTRAMUSCULAR; INTRAVENOUS EVERY 6 HOURS PRN
Status: DISCONTINUED | OUTPATIENT
Start: 2025-03-26 | End: 2025-03-26 | Stop reason: HOSPADM

## 2025-03-26 RX ORDER — HEPARIN 100 UNIT/ML
500 SYRINGE INTRAVENOUS AS NEEDED
OUTPATIENT
Start: 2025-03-26

## 2025-03-26 RX ORDER — HEPARIN SODIUM,PORCINE/PF 10 UNIT/ML
5 SYRINGE (ML) INTRAVENOUS ONCE AS NEEDED
Status: DISCONTINUED | OUTPATIENT
Start: 2025-03-26 | End: 2025-03-26 | Stop reason: HOSPADM

## 2025-03-26 RX ORDER — FAMOTIDINE 10 MG/ML
20 INJECTION, SOLUTION INTRAVENOUS ONCE AS NEEDED
Status: DISCONTINUED | OUTPATIENT
Start: 2025-03-26 | End: 2025-03-26 | Stop reason: HOSPADM

## 2025-03-26 RX ORDER — EPINEPHRINE 0.3 MG/.3ML
0.3 INJECTION SUBCUTANEOUS EVERY 5 MIN PRN
Status: DISCONTINUED | OUTPATIENT
Start: 2025-03-26 | End: 2025-03-26 | Stop reason: HOSPADM

## 2025-03-26 RX ORDER — DIPHENHYDRAMINE HYDROCHLORIDE 50 MG/ML
50 INJECTION, SOLUTION INTRAMUSCULAR; INTRAVENOUS AS NEEDED
Status: DISCONTINUED | OUTPATIENT
Start: 2025-03-26 | End: 2025-03-26 | Stop reason: HOSPADM

## 2025-03-26 RX ORDER — ALBUTEROL SULFATE 0.83 MG/ML
3 SOLUTION RESPIRATORY (INHALATION) AS NEEDED
Status: DISCONTINUED | OUTPATIENT
Start: 2025-03-26 | End: 2025-03-26 | Stop reason: HOSPADM

## 2025-03-26 RX ADMIN — HEPARIN 500 UNITS: 100 SYRINGE at 11:25

## 2025-03-26 RX ADMIN — SODIUM CHLORIDE 400 MG: 9 INJECTION, SOLUTION INTRAVENOUS at 10:37

## 2025-03-26 ASSESSMENT — PAIN SCALES - GENERAL: PAINLEVEL_OUTOF10: 0-NO PAIN

## 2025-03-26 NOTE — PROGRESS NOTES
[facility] Infusion Note     Harmony Victor is a 64 y.o. year old female here today,03/26/25,  in the [facility] infusion center for cycle 1 day 6 of the following treatment regimen:          Medications given:  Administrations This Visit       heparin flush 100 unit/mL syringe 500 Units       Admin Date  03/26/2025 Action  Given Dose  500 Units Route  intra-catheter Documented By  Charissa Tracey RN              pembrolizumab (Keytruda) 400 mg in sodium chloride 0.9% 126 mL IV       Admin Date  03/26/2025 Action  New Bag Dose  400 mg Route  intravenous Documented By  Charissa Tracey RN                    Pt tolerated infusion well and was discharged to home in stable condition. Pt ambulated  off the unit indepedently with a slow and steady gait. Pt had no further questions or concerns at this time.

## 2025-03-27 ENCOUNTER — APPOINTMENT (OUTPATIENT)
Dept: HEMATOLOGY/ONCOLOGY | Facility: HOSPITAL | Age: 65
End: 2025-03-27
Payer: COMMERCIAL

## 2025-04-17 PROCEDURE — RXMED WILLOW AMBULATORY MEDICATION CHARGE

## 2025-04-22 ENCOUNTER — PHARMACY VISIT (OUTPATIENT)
Dept: PHARMACY | Facility: CLINIC | Age: 65
End: 2025-04-22
Payer: COMMERCIAL

## 2025-04-22 ENCOUNTER — LAB (OUTPATIENT)
Dept: LAB | Facility: CLINIC | Age: 65
End: 2025-04-22
Payer: COMMERCIAL

## 2025-04-22 DIAGNOSIS — C18.9 MALIGNANT NEOPLASM OF COLON, UNSPECIFIED PART OF COLON (MULTI): ICD-10-CM

## 2025-04-22 LAB
BASOPHILS # BLD AUTO: 0.05 X10*3/UL (ref 0–0.1)
BASOPHILS NFR BLD AUTO: 1.1 %
EOSINOPHIL # BLD AUTO: 0.2 X10*3/UL (ref 0–0.7)
EOSINOPHIL NFR BLD AUTO: 4.2 %
ERYTHROCYTE [DISTWIDTH] IN BLOOD BY AUTOMATED COUNT: 14 % (ref 11.5–14.5)
HCT VFR BLD AUTO: 36.7 % (ref 36–46)
HGB BLD-MCNC: 11.9 G/DL (ref 12–16)
IMM GRANULOCYTES # BLD AUTO: 0 X10*3/UL (ref 0–0.7)
IMM GRANULOCYTES NFR BLD AUTO: 0 % (ref 0–0.9)
LYMPHOCYTES # BLD AUTO: 1.39 X10*3/UL (ref 1.2–4.8)
LYMPHOCYTES NFR BLD AUTO: 29.3 %
MCH RBC QN AUTO: 28.8 PG (ref 26–34)
MCHC RBC AUTO-ENTMCNC: 32.4 G/DL (ref 32–36)
MCV RBC AUTO: 89 FL (ref 80–100)
MONOCYTES # BLD AUTO: 0.4 X10*3/UL (ref 0.1–1)
MONOCYTES NFR BLD AUTO: 8.4 %
NEUTROPHILS # BLD AUTO: 2.7 X10*3/UL (ref 1.2–7.7)
NEUTROPHILS NFR BLD AUTO: 57 %
NRBC BLD-RTO: ABNORMAL /100{WBCS}
PLATELET # BLD AUTO: 244 X10*3/UL (ref 150–450)
RBC # BLD AUTO: 4.13 X10*6/UL (ref 4–5.2)
WBC # BLD AUTO: 4.7 X10*3/UL (ref 4.4–11.3)

## 2025-04-22 PROCEDURE — 82024 ASSAY OF ACTH: CPT

## 2025-04-22 PROCEDURE — 82533 TOTAL CORTISOL: CPT

## 2025-04-22 PROCEDURE — 84443 ASSAY THYROID STIM HORMONE: CPT

## 2025-04-22 PROCEDURE — 84075 ASSAY ALKALINE PHOSPHATASE: CPT

## 2025-04-22 PROCEDURE — 85025 COMPLETE CBC W/AUTO DIFF WBC: CPT

## 2025-04-23 LAB
ALBUMIN SERPL BCP-MCNC: 4.6 G/DL (ref 3.4–5)
ALP SERPL-CCNC: 94 U/L (ref 33–136)
ALT SERPL W P-5'-P-CCNC: 25 U/L (ref 7–45)
ANION GAP SERPL CALC-SCNC: 12 MMOL/L (ref 10–20)
AST SERPL W P-5'-P-CCNC: 24 U/L (ref 9–39)
BILIRUB SERPL-MCNC: 0.4 MG/DL (ref 0–1.2)
BUN SERPL-MCNC: 15 MG/DL (ref 6–23)
CALCIUM SERPL-MCNC: 9.9 MG/DL (ref 8.6–10.6)
CHLORIDE SERPL-SCNC: 103 MMOL/L (ref 98–107)
CO2 SERPL-SCNC: 28 MMOL/L (ref 21–32)
CORTIS AM PEAK SERPL-MSCNC: 13.6 UG/DL (ref 5–20)
CREAT SERPL-MCNC: 0.91 MG/DL (ref 0.5–1.05)
EGFRCR SERPLBLD CKD-EPI 2021: 71 ML/MIN/1.73M*2
GLUCOSE SERPL-MCNC: 93 MG/DL (ref 74–99)
POTASSIUM SERPL-SCNC: 4.1 MMOL/L (ref 3.5–5.3)
PROT SERPL-MCNC: 7.3 G/DL (ref 6.4–8.2)
SODIUM SERPL-SCNC: 139 MMOL/L (ref 136–145)
TSH SERPL-ACNC: 1.22 MIU/L (ref 0.44–3.98)

## 2025-04-24 ENCOUNTER — APPOINTMENT (OUTPATIENT)
Dept: RADIOLOGY | Facility: CLINIC | Age: 65
End: 2025-04-24
Payer: COMMERCIAL

## 2025-04-24 LAB — ACTH PLAS-MCNC: 18.5 PG/ML (ref 7.2–63.3)

## 2025-04-25 ENCOUNTER — HOSPITAL ENCOUNTER (OUTPATIENT)
Dept: RADIOLOGY | Facility: CLINIC | Age: 65
Discharge: HOME | End: 2025-04-25
Payer: COMMERCIAL

## 2025-04-25 DIAGNOSIS — C18.9 MALIGNANT NEOPLASM OF COLON, UNSPECIFIED PART OF COLON (MULTI): ICD-10-CM

## 2025-04-25 PROCEDURE — 2550000001 HC RX 255 CONTRASTS: Mod: JZ | Performed by: INTERNAL MEDICINE

## 2025-04-25 PROCEDURE — 74177 CT ABD & PELVIS W/CONTRAST: CPT

## 2025-04-25 RX ADMIN — IOHEXOL 75 ML: 350 INJECTION, SOLUTION INTRAVENOUS at 10:30

## 2025-04-28 ENCOUNTER — OFFICE VISIT (OUTPATIENT)
Dept: HEMATOLOGY/ONCOLOGY | Facility: CLINIC | Age: 65
End: 2025-04-28
Payer: COMMERCIAL

## 2025-04-28 VITALS
DIASTOLIC BLOOD PRESSURE: 54 MMHG | SYSTOLIC BLOOD PRESSURE: 93 MMHG | WEIGHT: 137.24 LBS | RESPIRATION RATE: 18 BRPM | OXYGEN SATURATION: 98 % | HEART RATE: 63 BPM | BODY MASS INDEX: 21.14 KG/M2 | TEMPERATURE: 96.8 F

## 2025-04-28 DIAGNOSIS — C18.9 MALIGNANT NEOPLASM OF COLON, UNSPECIFIED PART OF COLON (MULTI): Primary | ICD-10-CM

## 2025-04-28 PROCEDURE — 1036F TOBACCO NON-USER: CPT | Performed by: INTERNAL MEDICINE

## 2025-04-28 PROCEDURE — 99215 OFFICE O/P EST HI 40 MIN: CPT | Performed by: INTERNAL MEDICINE

## 2025-04-28 RX ORDER — PROCHLORPERAZINE MALEATE 10 MG
10 TABLET ORAL EVERY 6 HOURS PRN
OUTPATIENT
Start: 2025-04-30

## 2025-04-28 RX ORDER — DIPHENHYDRAMINE HYDROCHLORIDE 50 MG/ML
50 INJECTION, SOLUTION INTRAMUSCULAR; INTRAVENOUS AS NEEDED
OUTPATIENT
Start: 2025-04-30

## 2025-04-28 RX ORDER — FAMOTIDINE 10 MG/ML
20 INJECTION, SOLUTION INTRAVENOUS ONCE AS NEEDED
OUTPATIENT
Start: 2025-04-30

## 2025-04-28 RX ORDER — ALBUTEROL SULFATE 0.83 MG/ML
3 SOLUTION RESPIRATORY (INHALATION) AS NEEDED
OUTPATIENT
Start: 2025-04-30

## 2025-04-28 RX ORDER — EPINEPHRINE 0.3 MG/.3ML
0.3 INJECTION SUBCUTANEOUS EVERY 5 MIN PRN
OUTPATIENT
Start: 2025-04-30

## 2025-04-28 RX ORDER — PROCHLORPERAZINE EDISYLATE 5 MG/ML
10 INJECTION INTRAMUSCULAR; INTRAVENOUS EVERY 6 HOURS PRN
OUTPATIENT
Start: 2025-04-30

## 2025-04-28 ASSESSMENT — PAIN SCALES - GENERAL: PAINLEVEL_OUTOF10: 0-NO PAIN

## 2025-04-28 NOTE — PROGRESS NOTES
Patient ambulated into clinic for follow up with Dr. Rosado accompanied by her . Medications and allergies reviewed.     Reports she has been feeling well but is fatigued at times.     Continue on Pembrolizumab every 6 weeks at [place].     Follow up VV on 6/16.

## 2025-04-28 NOTE — PROGRESS NOTES
This was a telephone visit.  Consent obtained.      Diagnosis :  R-sided colon cancer with mets to liver and likely head of pancreas, deficient mismatch repair, started single-agent pembrolizumab in March 2024.    Genomic studies:  MMR-deficient (loss of MLH1 and PMS2  KRAS-WT  NRAS-WT  BRAF-WT  HER2 IHC equiv, FISH negative    TUMOR MARKER:   2/2024:  19, CA 19-9 492, CEA 6.2, AFP 2.1  8/24/24 CA 19-9: 21  10/3/24 CA 19-9: 19  10/3/24 CEA: 1.3    Assessment and plan:  This is a patient of Dr. Hayes.  I have reviewed her entire electronic chart to gather information on her condition.  Harmony is a pleasant 64-year-old woman who had a diagnosis of metastatic colon cancer in [place].  Her tumor was found to be deficient mismatch repair.  She received 1 cycle of FOLFOX followed by pembrolizumab which she started in March 2024.  She has received pembrolizumab for slightly over a year now without any significant difficulties except mild to moderate fatigue.  Her last CT scan in January 2025 showed stable disease.  Her CT scan from April of 2025 is largely unremarkable except a few indeterminate lung nodules.  I reviewed the scan with her and her  in detail today.    Plan: Continue pembrolizumab until 03/2026.    I have ordered the next dose of pembrolizumab to be administered on May 7, 2025.  Neck CT scan ordered for first week of September 2025.    Follow-up plan: Phone visit in 6 weeks prior to the next cycle of pembrolizumab.      HISTORY:   January 2024: Swelling and pain in left upper extremity   1/28/2024: US arm duplex left venous showed acute DVT in the mid to distal brachial vein, and superficial venous thrombosis mid distal basilic vein. US LEs showed b/l occlusive thrombus in L proximal femoral vein through the visible left upper calf veins. CT PE  mild burden of bilateral pulmonary emboli beginning at the segmental level and extending peripherally. No CT evidence of right heart strain.  CT  venogram abdomen: DVT LLE extending into superficial femoral vein. Partially visualized pulmonary emboli. Irregular bladder wall thickening. Enlarged intra-abdominal lymph nodes. Findings concerning for potential underlying malignant process.    1/29/2024: CT A/P: Multiple hypodense lesions throughout both lobes of the liver most consistent with hepatic metastatic disease. Inflammation within the adipose tissue the inferior pelvis surrounding the uterus and rectum extending to the presacral space. This is of uncertain etiology. No focal mass identified. Very small bilateral pleural effusions.    1/30/2024: R liver lesion biopsy. Path showed metastatic adenocarcinoma consistent with colorectal primary. Tumor cells positive for CK20, CDX2, SATB2. Negative for CK7, TTF 1, Napsin a, [place] 8, lara 3 and ER. Morphologic as well as immunohistochemical features consistent with metastatic colorectal carcinoma. 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 Defective mismatch repair. BRAF mutation not detected. KRAS mutation not detected. NRAS mutation not detected. HER2 negative.    2/1/2024: C-scope: arge ulcerated mass involving ileocecal valve. EGD showed normal mucosa in duodenum, stomach, esophagus. Pathology of ileocecal valve mass showed adenocarcinoma    2/19/24: FOLFOX x1. 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 FOLFOX discontinued  3/6/24: Started on pembrolizumab 400mg q6wk    7/22/24: PET CT showed no evidence of activity in the liver or colon, but with hypermetabolic focus in the posterior aspect of the pancreatic head.    January 2025: CT scan showed stable disease.    April 25, 2025: CT scan showed the following-  Colon cancer restaging scan compared to 01/30/2025 and 11/04/2024.  Interval improvement in disease burden, evidence by decreasing size  of the metastatic  liver lesions and portal caval lymph node without  evidence of new metastatic disease in the chest abdomen or pelvis.  She has a few indeterminate lung nodules which will be followed with serial scans.    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    Subjective:   Feelign well, no symptoms from treatment or from her cancer except moderate fatigue. No fevers, chills, chest pain, shortness of breath, abdominal pain, nausea, vomiting, diarrhea, or rashes.    ROS as above. Remainder of 10-point review of systems elicited and negative.    Physical examination:   ECOG PS- 1  Alert, ambulant, and answers questions appropriately.  Eyes- No pallor or icterus.  Neck- no swelling, lymph node enlargement or thyroid gland enlargement.  Chest- Bilateral good air entry. No crackles/ronchi.  Heart- no audible abnormal heart sounds.  Abdomen- Soft, non-tender. No mass or ascites.  Extremities- no swelling or pitting edema.      CT C/A/P 11/4/24:  Known hepatic metastasis are similar to decreased in size from prior  examination      Minimal bibasilar atelectasis. Right lower lobe nodular opacity  appears to be somewhat more confluence probably relating to  atelectasis but short-term follow-up is advised to exclude this is  not a developing pulmonary nodule. Short-term follow-up is advised.    Jaci Rosado MD.

## 2025-05-07 ENCOUNTER — INFUSION (OUTPATIENT)
Dept: HEMATOLOGY/ONCOLOGY | Facility: CLINIC | Age: 65
End: 2025-05-07
Payer: COMMERCIAL

## 2025-05-07 VITALS
OXYGEN SATURATION: 100 % | HEART RATE: 62 BPM | WEIGHT: 137.9 LBS | TEMPERATURE: 97 F | RESPIRATION RATE: 12 BRPM | BODY MASS INDEX: 20.9 KG/M2 | SYSTOLIC BLOOD PRESSURE: 105 MMHG | DIASTOLIC BLOOD PRESSURE: 57 MMHG | HEIGHT: 68 IN

## 2025-05-07 DIAGNOSIS — C18.9 MALIGNANT NEOPLASM OF COLON, UNSPECIFIED PART OF COLON (MULTI): ICD-10-CM

## 2025-05-07 PROCEDURE — 2500000004 HC RX 250 GENERAL PHARMACY W/ HCPCS (ALT 636 FOR OP/ED): Mod: JZ | Performed by: INTERNAL MEDICINE

## 2025-05-07 PROCEDURE — 2500000004 HC RX 250 GENERAL PHARMACY W/ HCPCS (ALT 636 FOR OP/ED): Mod: JZ | Performed by: HOME HEALTH AIDE

## 2025-05-07 PROCEDURE — 96413 CHEMO IV INFUSION 1 HR: CPT

## 2025-05-07 RX ORDER — EPINEPHRINE 0.3 MG/.3ML
0.3 INJECTION SUBCUTANEOUS EVERY 5 MIN PRN
Status: DISCONTINUED | OUTPATIENT
Start: 2025-05-07 | End: 2025-05-07 | Stop reason: HOSPADM

## 2025-05-07 RX ORDER — PROCHLORPERAZINE EDISYLATE 5 MG/ML
10 INJECTION INTRAMUSCULAR; INTRAVENOUS EVERY 6 HOURS PRN
Status: DISCONTINUED | OUTPATIENT
Start: 2025-05-07 | End: 2025-05-07 | Stop reason: HOSPADM

## 2025-05-07 RX ORDER — HEPARIN 100 UNIT/ML
500 SYRINGE INTRAVENOUS AS NEEDED
Status: DISCONTINUED | OUTPATIENT
Start: 2025-05-07 | End: 2025-05-07 | Stop reason: HOSPADM

## 2025-05-07 RX ORDER — ALBUTEROL SULFATE 0.83 MG/ML
3 SOLUTION RESPIRATORY (INHALATION) AS NEEDED
Status: DISCONTINUED | OUTPATIENT
Start: 2025-05-07 | End: 2025-05-07 | Stop reason: HOSPADM

## 2025-05-07 RX ORDER — DIPHENHYDRAMINE HYDROCHLORIDE 50 MG/ML
50 INJECTION, SOLUTION INTRAMUSCULAR; INTRAVENOUS AS NEEDED
Status: DISCONTINUED | OUTPATIENT
Start: 2025-05-07 | End: 2025-05-07 | Stop reason: HOSPADM

## 2025-05-07 RX ORDER — HEPARIN SODIUM,PORCINE/PF 10 UNIT/ML
50 SYRINGE (ML) INTRAVENOUS AS NEEDED
Status: CANCELLED | OUTPATIENT
Start: 2025-05-07

## 2025-05-07 RX ORDER — HEPARIN 100 UNIT/ML
500 SYRINGE INTRAVENOUS AS NEEDED
OUTPATIENT
Start: 2025-05-07

## 2025-05-07 RX ORDER — HEPARIN 100 UNIT/ML
500 SYRINGE INTRAVENOUS AS NEEDED
Status: CANCELLED | OUTPATIENT
Start: 2025-05-07

## 2025-05-07 RX ORDER — HEPARIN SODIUM,PORCINE/PF 10 UNIT/ML
50 SYRINGE (ML) INTRAVENOUS AS NEEDED
OUTPATIENT
Start: 2025-05-07

## 2025-05-07 RX ORDER — HEPARIN SODIUM,PORCINE/PF 10 UNIT/ML
50 SYRINGE (ML) INTRAVENOUS AS NEEDED
Status: DISCONTINUED | OUTPATIENT
Start: 2025-05-07 | End: 2025-05-07 | Stop reason: HOSPADM

## 2025-05-07 RX ORDER — PROCHLORPERAZINE MALEATE 10 MG
10 TABLET ORAL EVERY 6 HOURS PRN
Status: DISCONTINUED | OUTPATIENT
Start: 2025-05-07 | End: 2025-05-07 | Stop reason: HOSPADM

## 2025-05-07 RX ORDER — FAMOTIDINE 10 MG/ML
20 INJECTION, SOLUTION INTRAVENOUS ONCE AS NEEDED
Status: DISCONTINUED | OUTPATIENT
Start: 2025-05-07 | End: 2025-05-07 | Stop reason: HOSPADM

## 2025-05-07 RX ADMIN — HEPARIN 500 UNITS: 100 SYRINGE at 13:10

## 2025-05-07 RX ADMIN — SODIUM CHLORIDE 400 MG: 9 INJECTION, SOLUTION INTRAVENOUS at 12:21

## 2025-05-07 ASSESSMENT — PAIN SCALES - GENERAL: PAINLEVEL_OUTOF10: 0-NO PAIN

## 2025-05-07 NOTE — PROGRESS NOTES
[facility] Infusion Note     Harmony Victor is a 64 y.o. year old female here today,05/07/25,  in the [facility] infusion center for cycle 7 day 1 of the following treatment regimen:          Medications given:  Administrations This Visit       heparin flush 100 unit/mL syringe 500 Units       Admin Date  05/07/2025 Action  Given Dose  500 Units Route  intra-catheter Documented By  Charissa Tracey, RN              pembrolizumab (Keytruda) 400 mg in sodium chloride 0.9% 126 mL IV       Admin Date  05/07/2025 Action  New Bag Dose  400 mg Route  intravenous Documented By  Bjorn Wen, RN                    Pt tolerated infusion well and was discharged to home in stable condition. Pt ambulated  off the unit independently with a slow and steady gait. Pt had no further questions or concerns at this time.

## 2025-05-09 PROCEDURE — RXMED WILLOW AMBULATORY MEDICATION CHARGE

## 2025-05-16 ENCOUNTER — SPECIALTY PHARMACY (OUTPATIENT)
Dept: PHARMACY | Facility: CLINIC | Age: 65
End: 2025-05-16

## 2025-05-22 ENCOUNTER — PHARMACY VISIT (OUTPATIENT)
Dept: PHARMACY | Facility: CLINIC | Age: 65
End: 2025-05-22
Payer: COMMERCIAL

## 2025-06-09 ENCOUNTER — SPECIALTY PHARMACY (OUTPATIENT)
Dept: PHARMACY | Facility: CLINIC | Age: 65
End: 2025-06-09

## 2025-06-16 ENCOUNTER — APPOINTMENT (OUTPATIENT)
Dept: LAB | Facility: CLINIC | Age: 65
End: 2025-06-16
Payer: COMMERCIAL

## 2025-06-16 ENCOUNTER — LAB (OUTPATIENT)
Dept: LAB | Facility: CLINIC | Age: 65
End: 2025-06-16
Payer: COMMERCIAL

## 2025-06-16 ENCOUNTER — TELEMEDICINE (OUTPATIENT)
Dept: HEMATOLOGY/ONCOLOGY | Facility: CLINIC | Age: 65
End: 2025-06-16
Payer: COMMERCIAL

## 2025-06-16 DIAGNOSIS — C18.9 MALIGNANT NEOPLASM OF COLON, UNSPECIFIED PART OF COLON (MULTI): Primary | ICD-10-CM

## 2025-06-16 DIAGNOSIS — C18.9 MALIGNANT NEOPLASM OF COLON, UNSPECIFIED PART OF COLON (MULTI): ICD-10-CM

## 2025-06-16 LAB
BASOPHILS # BLD AUTO: 0.04 X10*3/UL (ref 0–0.1)
BASOPHILS NFR BLD AUTO: 0.9 %
EOSINOPHIL # BLD AUTO: 0.16 X10*3/UL (ref 0–0.7)
EOSINOPHIL NFR BLD AUTO: 3.5 %
ERYTHROCYTE [DISTWIDTH] IN BLOOD BY AUTOMATED COUNT: 13.9 % (ref 11.5–14.5)
HCT VFR BLD AUTO: 35.9 % (ref 36–46)
HGB BLD-MCNC: 11.8 G/DL (ref 12–16)
IMM GRANULOCYTES # BLD AUTO: 0.01 X10*3/UL (ref 0–0.7)
IMM GRANULOCYTES NFR BLD AUTO: 0.2 % (ref 0–0.9)
LYMPHOCYTES # BLD AUTO: 1.31 X10*3/UL (ref 1.2–4.8)
LYMPHOCYTES NFR BLD AUTO: 28.9 %
MCH RBC QN AUTO: 29.2 PG (ref 26–34)
MCHC RBC AUTO-ENTMCNC: 32.9 G/DL (ref 32–36)
MCV RBC AUTO: 89 FL (ref 80–100)
MONOCYTES # BLD AUTO: 0.45 X10*3/UL (ref 0.1–1)
MONOCYTES NFR BLD AUTO: 9.9 %
NEUTROPHILS # BLD AUTO: 2.57 X10*3/UL (ref 1.2–7.7)
NEUTROPHILS NFR BLD AUTO: 56.6 %
NRBC BLD-RTO: ABNORMAL /100{WBCS}
PLATELET # BLD AUTO: 228 X10*3/UL (ref 150–450)
RBC # BLD AUTO: 4.04 X10*6/UL (ref 4–5.2)
WBC # BLD AUTO: 4.5 X10*3/UL (ref 4.4–11.3)

## 2025-06-16 PROCEDURE — 82533 TOTAL CORTISOL: CPT

## 2025-06-16 PROCEDURE — 84443 ASSAY THYROID STIM HORMONE: CPT

## 2025-06-16 PROCEDURE — 85025 COMPLETE CBC W/AUTO DIFF WBC: CPT

## 2025-06-16 PROCEDURE — 80053 COMPREHEN METABOLIC PANEL: CPT

## 2025-06-16 PROCEDURE — 82378 CARCINOEMBRYONIC ANTIGEN: CPT

## 2025-06-16 PROCEDURE — 82024 ASSAY OF ACTH: CPT

## 2025-06-16 PROCEDURE — 36415 COLL VENOUS BLD VENIPUNCTURE: CPT

## 2025-06-16 PROCEDURE — 99213 OFFICE O/P EST LOW 20 MIN: CPT | Performed by: INTERNAL MEDICINE

## 2025-06-16 RX ORDER — DIPHENHYDRAMINE HYDROCHLORIDE 50 MG/ML
50 INJECTION, SOLUTION INTRAMUSCULAR; INTRAVENOUS AS NEEDED
Status: CANCELLED | OUTPATIENT
Start: 2025-06-18

## 2025-06-16 RX ORDER — EPINEPHRINE 0.3 MG/.3ML
0.3 INJECTION SUBCUTANEOUS EVERY 5 MIN PRN
Status: CANCELLED | OUTPATIENT
Start: 2025-06-18

## 2025-06-16 RX ORDER — FAMOTIDINE 10 MG/ML
20 INJECTION, SOLUTION INTRAVENOUS ONCE AS NEEDED
Status: CANCELLED | OUTPATIENT
Start: 2025-06-18

## 2025-06-16 RX ORDER — ALBUTEROL SULFATE 0.83 MG/ML
3 SOLUTION RESPIRATORY (INHALATION) AS NEEDED
Status: CANCELLED | OUTPATIENT
Start: 2025-06-18

## 2025-06-16 RX ORDER — PROCHLORPERAZINE EDISYLATE 5 MG/ML
10 INJECTION INTRAMUSCULAR; INTRAVENOUS EVERY 6 HOURS PRN
Status: CANCELLED | OUTPATIENT
Start: 2025-06-18

## 2025-06-16 RX ORDER — PROCHLORPERAZINE MALEATE 10 MG
10 TABLET ORAL EVERY 6 HOURS PRN
Status: CANCELLED | OUTPATIENT
Start: 2025-06-18

## 2025-06-16 NOTE — PROGRESS NOTES
This was a telephone visit.  Consent obtained.      Diagnosis :  R-sided colon cancer with mets to liver and likely head of pancreas, deficient mismatch repair, started single-agent pembrolizumab in March 2024.    Genomic studies:  MMR-deficient (loss of MLH1 and PMS2  KRAS-WT  NRAS-WT  BRAF-WT  HER2 IHC equiv, FISH negative    TUMOR MARKER:   2/2024:  19, CA 19-9 492, CEA 6.2, AFP 2.1  8/24/24 CA 19-9: 21  10/3/24 CA 19-9: 19  10/3/24 CEA: 1.3    Assessment and plan:  This is a patient of Dr. Hayes.  I have reviewed her entire electronic chart to gather information on her condition.  Harmony is a pleasant 64-year-old woman who had a diagnosis of metastatic colon cancer in [place].  Her tumor was found to be deficient mismatch repair.  She received 1 cycle of FOLFOX followed by pembrolizumab which she started in March 2024.  She has received pembrolizumab for slightly over a year now without any significant difficulties except mild to moderate fatigue.  Her last CT scan in January 2025 showed stable disease.  Her CT scan from April of 2025 is largely unremarkable except a few indeterminate lung nodules.    Plan: Continue pembrolizumab until 03/2026.    She reports today that she has been tolerating pembrolizumab well without any nausea, vomiting, diarrhea, skin rash or any other symptoms.  Some of her labs are back which are unremarkable.  If her labs return unremarkable, I plan to proceed with pembrolizumab on June 18, 2025.    Follow-up plan: Phone visit in 6 weeks prior to the next cycle of pembrolizumab.      HISTORY:   January 2024: Swelling and pain in left upper extremity   1/28/2024: US arm duplex left venous showed acute DVT in the mid to distal brachial vein, and superficial venous thrombosis mid distal basilic vein. US LEs showed b/l occlusive thrombus in L proximal femoral vein through the visible left upper calf veins. CT PE  mild burden of bilateral pulmonary emboli beginning at the segmental  level and extending peripherally. No CT evidence of right heart strain.  CT venogram abdomen: DVT LLE extending into superficial femoral vein. Partially visualized pulmonary emboli. Irregular bladder wall thickening. Enlarged intra-abdominal lymph nodes. Findings concerning for potential underlying malignant process.    1/29/2024: CT A/P: Multiple hypodense lesions throughout both lobes of the liver most consistent with hepatic metastatic disease. Inflammation within the adipose tissue the inferior pelvis surrounding the uterus and rectum extending to the presacral space. This is of uncertain etiology. No focal mass identified. Very small bilateral pleural effusions.    1/30/2024: R liver lesion biopsy. Path showed metastatic adenocarcinoma consistent with colorectal primary. Tumor cells positive for CK20, CDX2, SATB2. Negative for CK7, TTF 1, Napsin a, [place] 8, lara 3 and ER. Morphologic as well as immunohistochemical features consistent with metastatic colorectal carcinoma. 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 Defective mismatch repair. BRAF mutation not detected. KRAS mutation not detected. NRAS mutation not detected. HER2 negative.    2/1/2024: C-scope: arge ulcerated mass involving ileocecal valve. EGD showed normal mucosa in duodenum, stomach, esophagus. Pathology of ileocecal valve mass showed adenocarcinoma    2/19/24: FOLFOX x1. 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 FOLFOX discontinued  3/6/24: Started on pembrolizumab 400mg q6wk    7/22/24: PET CT showed no evidence of activity in the liver or colon, but with hypermetabolic focus in the posterior aspect of the pancreatic head.    January 2025: CT scan showed stable disease.    April 25, 2025: CT scan showed the following-  Colon cancer restaging scan compared to 01/30/2025 and 11/04/2024.  Interval  improvement in disease burden, evidence by decreasing size  of the metastatic liver lesions and portal caval lymph node without  evidence of new metastatic disease in the chest abdomen or pelvis.  She has a few indeterminate lung nodules which will be followed with serial scans.    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    Subjective:   She reports no symptoms at this time.  She denies nausea or vomiting or diarrhea.  She denies skin rash.  She denies any fatigue.  She reports normal oral intake and activity level.      Physical examination: Deferred (phone appointment).      CT C/A/P 11/4/24:  Known hepatic metastasis are similar to decreased in size from prior  examination      Minimal bibasilar atelectasis. Right lower lobe nodular opacity  appears to be somewhat more confluence probably relating to  atelectasis but short-term follow-up is advised to exclude this is  not a developing pulmonary nodule. Short-term follow-up is advised.    Total time spent 20 minutes.  Jaci Rosado MD.

## 2025-06-17 LAB
ALBUMIN SERPL BCP-MCNC: 4.3 G/DL (ref 3.4–5)
ALP SERPL-CCNC: 75 U/L (ref 33–136)
ALT SERPL W P-5'-P-CCNC: 17 U/L (ref 7–45)
ANION GAP SERPL CALC-SCNC: 13 MMOL/L (ref 10–20)
AST SERPL W P-5'-P-CCNC: 24 U/L (ref 9–39)
BILIRUB SERPL-MCNC: 0.4 MG/DL (ref 0–1.2)
BUN SERPL-MCNC: 18 MG/DL (ref 6–23)
CALCIUM SERPL-MCNC: 9.8 MG/DL (ref 8.6–10.6)
CEA SERPL-MCNC: 1.8 UG/L
CHLORIDE SERPL-SCNC: 105 MMOL/L (ref 98–107)
CO2 SERPL-SCNC: 26 MMOL/L (ref 21–32)
CORTIS AM PEAK SERPL-MSCNC: 7.8 UG/DL (ref 5–20)
CREAT SERPL-MCNC: 0.88 MG/DL (ref 0.5–1.05)
EGFRCR SERPLBLD CKD-EPI 2021: 74 ML/MIN/1.73M*2
GLUCOSE SERPL-MCNC: 87 MG/DL (ref 74–99)
POTASSIUM SERPL-SCNC: 4 MMOL/L (ref 3.5–5.3)
PROT SERPL-MCNC: 6.9 G/DL (ref 6.4–8.2)
SODIUM SERPL-SCNC: 140 MMOL/L (ref 136–145)
TSH SERPL-ACNC: 1.1 MIU/L (ref 0.44–3.98)

## 2025-06-18 ENCOUNTER — INFUSION (OUTPATIENT)
Dept: HEMATOLOGY/ONCOLOGY | Facility: CLINIC | Age: 65
End: 2025-06-18
Payer: COMMERCIAL

## 2025-06-18 VITALS
TEMPERATURE: 98.1 F | SYSTOLIC BLOOD PRESSURE: 106 MMHG | HEART RATE: 79 BPM | DIASTOLIC BLOOD PRESSURE: 67 MMHG | WEIGHT: 136.8 LBS | OXYGEN SATURATION: 97 % | BODY MASS INDEX: 20.73 KG/M2 | RESPIRATION RATE: 16 BRPM | HEIGHT: 68 IN

## 2025-06-18 DIAGNOSIS — C18.9 MALIGNANT NEOPLASM OF COLON, UNSPECIFIED PART OF COLON (MULTI): ICD-10-CM

## 2025-06-18 LAB — ACTH PLAS-MCNC: 14.7 PG/ML (ref 7.2–63.3)

## 2025-06-18 PROCEDURE — 2500000004 HC RX 250 GENERAL PHARMACY W/ HCPCS (ALT 636 FOR OP/ED): Performed by: INTERNAL MEDICINE

## 2025-06-18 PROCEDURE — 96413 CHEMO IV INFUSION 1 HR: CPT

## 2025-06-18 RX ORDER — DIPHENHYDRAMINE HYDROCHLORIDE 50 MG/ML
50 INJECTION, SOLUTION INTRAMUSCULAR; INTRAVENOUS AS NEEDED
Status: DISCONTINUED | OUTPATIENT
Start: 2025-06-18 | End: 2025-06-18 | Stop reason: HOSPADM

## 2025-06-18 RX ORDER — PROCHLORPERAZINE EDISYLATE 5 MG/ML
10 INJECTION INTRAMUSCULAR; INTRAVENOUS EVERY 6 HOURS PRN
Status: DISCONTINUED | OUTPATIENT
Start: 2025-06-18 | End: 2025-06-18 | Stop reason: HOSPADM

## 2025-06-18 RX ORDER — HEPARIN SODIUM,PORCINE/PF 10 UNIT/ML
50 SYRINGE (ML) INTRAVENOUS AS NEEDED
Status: DISCONTINUED | OUTPATIENT
Start: 2025-06-18 | End: 2025-06-18 | Stop reason: HOSPADM

## 2025-06-18 RX ORDER — HEPARIN SODIUM,PORCINE/PF 10 UNIT/ML
50 SYRINGE (ML) INTRAVENOUS AS NEEDED
OUTPATIENT
Start: 2025-06-18

## 2025-06-18 RX ORDER — HEPARIN 100 UNIT/ML
500 SYRINGE INTRAVENOUS AS NEEDED
Status: DISCONTINUED | OUTPATIENT
Start: 2025-06-18 | End: 2025-06-18 | Stop reason: HOSPADM

## 2025-06-18 RX ORDER — FAMOTIDINE 10 MG/ML
20 INJECTION, SOLUTION INTRAVENOUS ONCE AS NEEDED
Status: DISCONTINUED | OUTPATIENT
Start: 2025-06-18 | End: 2025-06-18 | Stop reason: HOSPADM

## 2025-06-18 RX ORDER — PROCHLORPERAZINE MALEATE 10 MG
10 TABLET ORAL EVERY 6 HOURS PRN
Status: DISCONTINUED | OUTPATIENT
Start: 2025-06-18 | End: 2025-06-18 | Stop reason: HOSPADM

## 2025-06-18 RX ORDER — HEPARIN 100 UNIT/ML
500 SYRINGE INTRAVENOUS AS NEEDED
OUTPATIENT
Start: 2025-06-18

## 2025-06-18 RX ORDER — ALBUTEROL SULFATE 0.83 MG/ML
3 SOLUTION RESPIRATORY (INHALATION) AS NEEDED
Status: DISCONTINUED | OUTPATIENT
Start: 2025-06-18 | End: 2025-06-18 | Stop reason: HOSPADM

## 2025-06-18 RX ORDER — EPINEPHRINE 0.3 MG/.3ML
0.3 INJECTION SUBCUTANEOUS EVERY 5 MIN PRN
Status: DISCONTINUED | OUTPATIENT
Start: 2025-06-18 | End: 2025-06-18 | Stop reason: HOSPADM

## 2025-06-18 RX ADMIN — HEPARIN 500 UNITS: 100 SYRINGE at 12:53

## 2025-06-18 RX ADMIN — SODIUM CHLORIDE 400 MG: 9 INJECTION, SOLUTION INTRAVENOUS at 12:10

## 2025-06-18 ASSESSMENT — PAIN SCALES - GENERAL: PAINLEVEL_OUTOF10: 0-NO PAIN

## 2025-06-18 NOTE — PROGRESS NOTES
[facility] Infusion Note     Harmony Victor is a 65 y.o. year old female here today,06/18/25,  in the [facility] infusion center for cycle 8 day 1 of the following treatment regimen:    Pembrolizumab, 42 Day Cycles          Medications given:  Administrations This Visit       heparin flush 100 unit/mL syringe 500 Units       Admin Date  06/18/2025 Action  Given Dose  500 Units Route  intra-catheter Documented By  Samra Ott, RN              pembrolizumab (Keytruda) 400 mg in sodium chloride 0.9% 126 mL IV       Admin Date  06/18/2025 Action  New Bag Dose  400 mg Route  intravenous Documented By  Charissa Tracey RN                    Pt tolerated infusion well and was discharged to home in stable condition. Pt ambulated  off the unit independently with a slow and steady gait. Pt had no further questions or concerns at this time.

## 2025-06-24 PROCEDURE — RXMED WILLOW AMBULATORY MEDICATION CHARGE

## 2025-06-25 ENCOUNTER — PHARMACY VISIT (OUTPATIENT)
Dept: PHARMACY | Facility: CLINIC | Age: 65
End: 2025-06-25
Payer: COMMERCIAL

## 2025-07-14 ENCOUNTER — SOCIAL WORK (OUTPATIENT)
Dept: HEMATOLOGY/ONCOLOGY | Facility: CLINIC | Age: 65
End: 2025-07-14
Payer: COMMERCIAL

## 2025-07-14 NOTE — PROGRESS NOTES
This  received a phone call from this pt asking about her Keytruda Patient Assistance that will  25.     The SW gave the pt the name & phone numbers to the Financial Navigator Mandy RIOS: [phone] (,Tues,Thurs) & [phone] (Wed, Fri).     The pt reports she will contact Mandy to finish up her renewal since this is an IV drug.     The SW also emailed the financial navigator to make her aware.     The pt was appreciative of assistance.

## 2025-07-21 ENCOUNTER — SPECIALTY PHARMACY (OUTPATIENT)
Dept: PHARMACY | Facility: CLINIC | Age: 65
End: 2025-07-21

## 2025-07-21 PROCEDURE — RXMED WILLOW AMBULATORY MEDICATION CHARGE

## 2025-07-22 ENCOUNTER — PHARMACY VISIT (OUTPATIENT)
Dept: PHARMACY | Facility: CLINIC | Age: 65
End: 2025-07-22
Payer: COMMERCIAL

## 2025-07-28 ENCOUNTER — TELEMEDICINE (OUTPATIENT)
Dept: HEMATOLOGY/ONCOLOGY | Facility: CLINIC | Age: 65
End: 2025-07-28
Payer: COMMERCIAL

## 2025-07-28 ENCOUNTER — LAB (OUTPATIENT)
Dept: LAB | Facility: CLINIC | Age: 65
End: 2025-07-28
Payer: COMMERCIAL

## 2025-07-28 DIAGNOSIS — C18.9 MALIGNANT NEOPLASM OF COLON, UNSPECIFIED PART OF COLON (MULTI): Primary | ICD-10-CM

## 2025-07-28 DIAGNOSIS — C18.9 MALIGNANT NEOPLASM OF COLON, UNSPECIFIED PART OF COLON (MULTI): ICD-10-CM

## 2025-07-28 LAB
ALBUMIN SERPL BCP-MCNC: 4.7 G/DL (ref 3.4–5)
ALP SERPL-CCNC: 85 U/L (ref 33–136)
ALT SERPL W P-5'-P-CCNC: 37 U/L (ref 7–45)
ANION GAP SERPL CALC-SCNC: 15 MMOL/L (ref 10–20)
AST SERPL W P-5'-P-CCNC: 32 U/L (ref 9–39)
BASOPHILS # BLD AUTO: 0.05 X10*3/UL (ref 0–0.1)
BASOPHILS NFR BLD AUTO: 0.9 %
BILIRUB SERPL-MCNC: 0.4 MG/DL (ref 0–1.2)
BUN SERPL-MCNC: 21 MG/DL (ref 6–23)
CALCIUM SERPL-MCNC: 10.2 MG/DL (ref 8.6–10.6)
CEA SERPL-MCNC: 1.2 UG/L
CHLORIDE SERPL-SCNC: 102 MMOL/L (ref 98–107)
CO2 SERPL-SCNC: 28 MMOL/L (ref 21–32)
CORTIS AM PEAK SERPL-MCNC: 11.1 UG/DL (ref 5–20)
CREAT SERPL-MCNC: 0.95 MG/DL (ref 0.5–1.05)
EGFRCR SERPLBLD CKD-EPI 2021: 67 ML/MIN/1.73M*2
EOSINOPHIL # BLD AUTO: 0.16 X10*3/UL (ref 0–0.7)
EOSINOPHIL NFR BLD AUTO: 2.9 %
ERYTHROCYTE [DISTWIDTH] IN BLOOD BY AUTOMATED COUNT: 14 % (ref 11.5–14.5)
GLUCOSE SERPL-MCNC: 94 MG/DL (ref 74–99)
HCT VFR BLD AUTO: 39.9 % (ref 36–46)
HGB BLD-MCNC: 12.8 G/DL (ref 12–16)
IMM GRANULOCYTES # BLD AUTO: 0 X10*3/UL (ref 0–0.7)
IMM GRANULOCYTES NFR BLD AUTO: 0 % (ref 0–0.9)
LYMPHOCYTES # BLD AUTO: 1.35 X10*3/UL (ref 1.2–4.8)
LYMPHOCYTES NFR BLD AUTO: 24.5 %
MCH RBC QN AUTO: 28.5 PG (ref 26–34)
MCHC RBC AUTO-ENTMCNC: 32.1 G/DL (ref 32–36)
MCV RBC AUTO: 89 FL (ref 80–100)
MONOCYTES # BLD AUTO: 0.41 X10*3/UL (ref 0.1–1)
MONOCYTES NFR BLD AUTO: 7.5 %
NEUTROPHILS # BLD AUTO: 3.53 X10*3/UL (ref 1.2–7.7)
NEUTROPHILS NFR BLD AUTO: 64.2 %
NRBC BLD-RTO: NORMAL /100{WBCS}
PLATELET # BLD AUTO: 250 X10*3/UL (ref 150–450)
POTASSIUM SERPL-SCNC: 4.6 MMOL/L (ref 3.5–5.3)
PROT SERPL-MCNC: 7.5 G/DL (ref 6.4–8.2)
RBC # BLD AUTO: 4.49 X10*6/UL (ref 4–5.2)
SODIUM SERPL-SCNC: 140 MMOL/L (ref 136–145)
TSH SERPL-ACNC: 1.4 MIU/L (ref 0.44–3.98)
WBC # BLD AUTO: 5.5 X10*3/UL (ref 4.4–11.3)

## 2025-07-28 PROCEDURE — 84443 ASSAY THYROID STIM HORMONE: CPT

## 2025-07-28 PROCEDURE — 80053 COMPREHEN METABOLIC PANEL: CPT

## 2025-07-28 PROCEDURE — 85025 COMPLETE CBC W/AUTO DIFF WBC: CPT

## 2025-07-28 PROCEDURE — 82024 ASSAY OF ACTH: CPT

## 2025-07-28 PROCEDURE — 99213 OFFICE O/P EST LOW 20 MIN: CPT | Performed by: INTERNAL MEDICINE

## 2025-07-28 PROCEDURE — 82533 TOTAL CORTISOL: CPT

## 2025-07-28 PROCEDURE — 36415 COLL VENOUS BLD VENIPUNCTURE: CPT

## 2025-07-28 PROCEDURE — 82378 CARCINOEMBRYONIC ANTIGEN: CPT

## 2025-07-28 RX ORDER — DIPHENHYDRAMINE HYDROCHLORIDE 50 MG/ML
50 INJECTION, SOLUTION INTRAMUSCULAR; INTRAVENOUS AS NEEDED
Status: CANCELLED | OUTPATIENT
Start: 2025-07-30

## 2025-07-28 RX ORDER — ALBUTEROL SULFATE 0.83 MG/ML
3 SOLUTION RESPIRATORY (INHALATION) AS NEEDED
Status: CANCELLED | OUTPATIENT
Start: 2025-07-30

## 2025-07-28 RX ORDER — FAMOTIDINE 10 MG/ML
20 INJECTION, SOLUTION INTRAVENOUS ONCE AS NEEDED
Status: CANCELLED | OUTPATIENT
Start: 2025-07-30

## 2025-07-28 RX ORDER — PROCHLORPERAZINE MALEATE 10 MG
10 TABLET ORAL EVERY 6 HOURS PRN
Status: CANCELLED | OUTPATIENT
Start: 2025-07-30

## 2025-07-28 RX ORDER — PROCHLORPERAZINE EDISYLATE 5 MG/ML
10 INJECTION INTRAMUSCULAR; INTRAVENOUS EVERY 6 HOURS PRN
Status: CANCELLED | OUTPATIENT
Start: 2025-07-30

## 2025-07-28 RX ORDER — EPINEPHRINE 0.3 MG/.3ML
0.3 INJECTION SUBCUTANEOUS EVERY 5 MIN PRN
Status: CANCELLED | OUTPATIENT
Start: 2025-07-30

## 2025-07-28 NOTE — PROGRESS NOTES
This was a telephone visit.  Consent obtained.      Diagnosis :  R-sided colon cancer with mets to liver and likely head of pancreas, deficient mismatch repair, started single-agent pembrolizumab in March 2024.    Genomic studies:  MMR-deficient (loss of MLH1 and PMS2  KRAS-WT  NRAS-WT  BRAF-WT  HER2 IHC equiv, FISH negative    TUMOR MARKER:   2/2024:  19, CA 19-9 492, CEA 6.2, AFP 2.1  8/24/24 CA 19-9: 21  10/3/24 CA 19-9: 19  10/3/24 CEA: 1.3    Assessment and plan:  This is a patient of Dr. Hayes.  I have reviewed her entire electronic chart to gather information on her condition.  Harmony is a pleasant 64-year-old woman who had a diagnosis of metastatic colon cancer in [place].  Her tumor was found to be deficient mismatch repair.  She received 1 cycle of FOLFOX followed by pembrolizumab which she started in March 2024.  She has received pembrolizumab for slightly over a year now without any significant difficulties except mild to moderate fatigue.  Her last CT scan in January 2025 showed stable disease.  Her CT scan from April of 2025 is largely unremarkable except a few indeterminate lung nodules.    Plan: Continue pembrolizumab until 03/2026.    She reports today that she has been tolerating pembrolizumab well without any nausea, vomiting, diarrhea, skin rash or any other symptoms.  Some of her labs are back which are unremarkable.  If her labs return unremarkable, I plan to proceed with pembrolizumab on July 30, 2025.    Follow-up plan: Phone visit in 6 weeks prior to the next cycle of pembrolizumab.  Scan scheduled for early September 2025.    HISTORY:   January 2024: Swelling and pain in left upper extremity   1/28/2024: US arm duplex left venous showed acute DVT in the mid to distal brachial vein, and superficial venous thrombosis mid distal basilic vein. US LEs showed b/l occlusive thrombus in L proximal femoral vein through the visible left upper calf veins. CT PE  mild burden of bilateral  pulmonary emboli beginning at the segmental level and extending peripherally. No CT evidence of right heart strain.  CT venogram abdomen: DVT LLE extending into superficial femoral vein. Partially visualized pulmonary emboli. Irregular bladder wall thickening. Enlarged intra-abdominal lymph nodes. Findings concerning for potential underlying malignant process.    1/29/2024: CT A/P: Multiple hypodense lesions throughout both lobes of the liver most consistent with hepatic metastatic disease. Inflammation within the adipose tissue the inferior pelvis surrounding the uterus and rectum extending to the presacral space. This is of uncertain etiology. No focal mass identified. Very small bilateral pleural effusions.    1/30/2024: R liver lesion biopsy. Path showed metastatic adenocarcinoma consistent with colorectal primary. Tumor cells positive for CK20, CDX2, SATB2. Negative for CK7, TTF 1, Napsin a, [place] 8, lara 3 and ER. Morphologic as well as immunohistochemical features consistent with metastatic colorectal carcinoma. 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 Defective mismatch repair. BRAF mutation not detected. KRAS mutation not detected. NRAS mutation not detected. HER2 negative.    2/1/2024: C-scope: arge ulcerated mass involving ileocecal valve. EGD showed normal mucosa in duodenum, stomach, esophagus. Pathology of ileocecal valve mass showed adenocarcinoma    2/19/24: FOLFOX x1. 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 FOLFOX discontinued  3/6/24: Started on pembrolizumab 400mg q6wk    7/22/24: PET CT showed no evidence of activity in the liver or colon, but with hypermetabolic focus in the posterior aspect of the pancreatic head.    January 2025: CT scan showed stable disease.    April 25, 2025: CT scan showed the following-  Colon cancer restaging scan compared to  01/30/2025 and 11/04/2024.  Interval improvement in disease burden, evidence by decreasing size  of the metastatic liver lesions and portal caval lymph node without  evidence of new metastatic disease in the chest abdomen or pelvis.  She has a few indeterminate lung nodules which will be followed with serial scans.    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    Subjective:   She reports no symptoms at this time.  She denies nausea or vomiting or diarrhea.  She denies skin rash.  She denies any fatigue.  She reports normal oral intake and activity level.      Physical examination: Deferred (phone appointment).      CT C/A/P 11/4/24:  Known hepatic metastasis are similar to decreased in size from prior  examination      Minimal bibasilar atelectasis. Right lower lobe nodular opacity  appears to be somewhat more confluence probably relating to  atelectasis but short-term follow-up is advised to exclude this is  not a developing pulmonary nodule. Short-term follow-up is advised.    Total time spent 20 minutes.  Jaci Rosado MD.

## 2025-07-30 ENCOUNTER — INFUSION (OUTPATIENT)
Dept: HEMATOLOGY/ONCOLOGY | Facility: CLINIC | Age: 65
End: 2025-07-30
Payer: COMMERCIAL

## 2025-07-30 VITALS
BODY MASS INDEX: 20.5 KG/M2 | HEART RATE: 74 BPM | RESPIRATION RATE: 16 BRPM | SYSTOLIC BLOOD PRESSURE: 97 MMHG | HEIGHT: 68 IN | WEIGHT: 135.25 LBS | TEMPERATURE: 97.2 F | OXYGEN SATURATION: 98 % | DIASTOLIC BLOOD PRESSURE: 62 MMHG

## 2025-07-30 DIAGNOSIS — C18.9 MALIGNANT NEOPLASM OF COLON, UNSPECIFIED PART OF COLON (MULTI): ICD-10-CM

## 2025-07-30 LAB — ACTH PLAS-MCNC: 17.7 PG/ML (ref 7.2–63.3)

## 2025-07-30 PROCEDURE — 96413 CHEMO IV INFUSION 1 HR: CPT

## 2025-07-30 PROCEDURE — 2500000004 HC RX 250 GENERAL PHARMACY W/ HCPCS (ALT 636 FOR OP/ED): Performed by: INTERNAL MEDICINE

## 2025-07-30 RX ORDER — PROCHLORPERAZINE MALEATE 10 MG
10 TABLET ORAL EVERY 6 HOURS PRN
Status: DISCONTINUED | OUTPATIENT
Start: 2025-07-30 | End: 2025-07-30 | Stop reason: HOSPADM

## 2025-07-30 RX ORDER — DIPHENHYDRAMINE HYDROCHLORIDE 50 MG/ML
50 INJECTION, SOLUTION INTRAMUSCULAR; INTRAVENOUS AS NEEDED
Status: DISCONTINUED | OUTPATIENT
Start: 2025-07-30 | End: 2025-07-30 | Stop reason: HOSPADM

## 2025-07-30 RX ORDER — ALBUTEROL SULFATE 0.83 MG/ML
3 SOLUTION RESPIRATORY (INHALATION) AS NEEDED
Status: DISCONTINUED | OUTPATIENT
Start: 2025-07-30 | End: 2025-07-30 | Stop reason: HOSPADM

## 2025-07-30 RX ORDER — EPINEPHRINE 0.3 MG/.3ML
0.3 INJECTION SUBCUTANEOUS EVERY 5 MIN PRN
Status: DISCONTINUED | OUTPATIENT
Start: 2025-07-30 | End: 2025-07-30 | Stop reason: HOSPADM

## 2025-07-30 RX ORDER — HEPARIN 100 UNIT/ML
500 SYRINGE INTRAVENOUS AS NEEDED
Status: DISCONTINUED | OUTPATIENT
Start: 2025-07-30 | End: 2025-07-30 | Stop reason: HOSPADM

## 2025-07-30 RX ORDER — PROCHLORPERAZINE EDISYLATE 5 MG/ML
10 INJECTION INTRAMUSCULAR; INTRAVENOUS EVERY 6 HOURS PRN
Status: DISCONTINUED | OUTPATIENT
Start: 2025-07-30 | End: 2025-07-30 | Stop reason: HOSPADM

## 2025-07-30 RX ORDER — HEPARIN 100 UNIT/ML
500 SYRINGE INTRAVENOUS AS NEEDED
OUTPATIENT
Start: 2025-07-30

## 2025-07-30 RX ORDER — HEPARIN SODIUM,PORCINE/PF 10 UNIT/ML
50 SYRINGE (ML) INTRAVENOUS AS NEEDED
OUTPATIENT
Start: 2025-07-30

## 2025-07-30 RX ORDER — FAMOTIDINE 10 MG/ML
20 INJECTION, SOLUTION INTRAVENOUS ONCE AS NEEDED
Status: DISCONTINUED | OUTPATIENT
Start: 2025-07-30 | End: 2025-07-30 | Stop reason: HOSPADM

## 2025-07-30 RX ORDER — HEPARIN SODIUM,PORCINE/PF 10 UNIT/ML
50 SYRINGE (ML) INTRAVENOUS AS NEEDED
Status: DISCONTINUED | OUTPATIENT
Start: 2025-07-30 | End: 2025-07-30 | Stop reason: HOSPADM

## 2025-07-30 RX ADMIN — SODIUM CHLORIDE 400 MG: 9 INJECTION, SOLUTION INTRAVENOUS at 12:06

## 2025-07-30 RX ADMIN — HEPARIN 500 UNITS: 100 SYRINGE at 12:46

## 2025-07-30 ASSESSMENT — PAIN SCALES - GENERAL: PAINLEVEL_OUTOF10: 0-NO PAIN

## 2025-07-30 NOTE — PROGRESS NOTES
[facility] Infusion Note     Harmony Victor is a 65 y.o. year old female here today,07/30/25,  in the [facility] infusion center for cycle 9 of the following treatment regimen:    Pembrolizumab, 42 Day Cycles     Medications given:    Administrations This Visit       heparin flush 100 unit/mL syringe 500 Units       Admin Date  07/30/2025 Action  Given Dose  500 Units Route  intra-catheter Documented By  Samanta Feliz RN              pembrolizumab (Keytruda) 400 mg in sodium chloride 0.9% 126 mL IV       Admin Date  07/30/2025 Action  New Bag Dose  400 mg Route  intravenous Documented By  Samanta Feliz RN                Pt tolerated medications listed above well and was discharged to home in stable condition. Pt had no further questions or concerns at this time. Pt ambulated off the unit independently with a slow and steady gait.

## 2025-08-18 DIAGNOSIS — C18.9 MALIGNANT NEOPLASM OF COLON, UNSPECIFIED PART OF COLON (MULTI): ICD-10-CM

## 2025-08-20 ENCOUNTER — SPECIALTY PHARMACY (OUTPATIENT)
Dept: PHARMACY | Facility: CLINIC | Age: 65
End: 2025-08-20

## 2025-08-20 PROCEDURE — RXMED WILLOW AMBULATORY MEDICATION CHARGE

## 2025-08-25 ENCOUNTER — PHARMACY VISIT (OUTPATIENT)
Dept: PHARMACY | Facility: CLINIC | Age: 65
End: 2025-08-25
Payer: COMMERCIAL

## 2025-09-02 ENCOUNTER — APPOINTMENT (OUTPATIENT)
Dept: RADIOLOGY | Facility: HOSPITAL | Age: 65
End: 2025-09-02
Payer: COMMERCIAL

## 2025-09-02 ENCOUNTER — HOSPITAL ENCOUNTER (OUTPATIENT)
Dept: RADIOLOGY | Facility: CLINIC | Age: 65
Discharge: HOME | End: 2025-09-02
Payer: COMMERCIAL

## 2025-09-02 DIAGNOSIS — C18.9 MALIGNANT NEOPLASM OF COLON, UNSPECIFIED PART OF COLON (MULTI): ICD-10-CM

## 2025-09-02 PROCEDURE — 2550000001 HC RX 255 CONTRASTS: Performed by: INTERNAL MEDICINE

## 2025-09-02 PROCEDURE — 71260 CT THORAX DX C+: CPT

## 2025-09-02 PROCEDURE — 71260 CT THORAX DX C+: CPT | Performed by: RADIOLOGY

## 2025-09-02 PROCEDURE — 74177 CT ABD & PELVIS W/CONTRAST: CPT | Performed by: RADIOLOGY

## 2025-09-02 RX ADMIN — IOHEXOL 75 ML: 350 INJECTION, SOLUTION INTRAVENOUS at 10:44
